# Patient Record
Sex: FEMALE | Race: WHITE | Employment: OTHER | URBAN - METROPOLITAN AREA
[De-identification: names, ages, dates, MRNs, and addresses within clinical notes are randomized per-mention and may not be internally consistent; named-entity substitution may affect disease eponyms.]

---

## 2022-01-31 ENCOUNTER — HOME HEALTH ADMISSION (OUTPATIENT)
Dept: HOME HEALTH SERVICES | Facility: HOME HEALTH | Age: 77
End: 2022-01-31
Payer: MEDICARE

## 2022-02-02 ENCOUNTER — HOME CARE VISIT (OUTPATIENT)
Dept: SCHEDULING | Facility: HOME HEALTH | Age: 77
End: 2022-02-02
Payer: MEDICARE

## 2022-02-02 VITALS
TEMPERATURE: 98.3 F | SYSTOLIC BLOOD PRESSURE: 130 MMHG | OXYGEN SATURATION: 98 % | RESPIRATION RATE: 16 BRPM | DIASTOLIC BLOOD PRESSURE: 70 MMHG | HEART RATE: 68 BPM

## 2022-02-02 PROCEDURE — 400013 HH SOC

## 2022-02-02 PROCEDURE — G0151 HHCP-SERV OF PT,EA 15 MIN: HCPCS

## 2022-02-03 ENCOUNTER — HOME CARE VISIT (OUTPATIENT)
Dept: SCHEDULING | Facility: HOME HEALTH | Age: 77
End: 2022-02-03
Payer: MEDICARE

## 2022-02-03 VITALS
OXYGEN SATURATION: 98 % | RESPIRATION RATE: 16 BRPM | TEMPERATURE: 98.9 F | HEART RATE: 76 BPM | DIASTOLIC BLOOD PRESSURE: 70 MMHG | SYSTOLIC BLOOD PRESSURE: 130 MMHG

## 2022-02-03 PROCEDURE — G0151 HHCP-SERV OF PT,EA 15 MIN: HCPCS

## 2022-02-08 ENCOUNTER — HOME CARE VISIT (OUTPATIENT)
Dept: SCHEDULING | Facility: HOME HEALTH | Age: 77
End: 2022-02-08
Payer: MEDICARE

## 2022-02-08 VITALS
TEMPERATURE: 96.9 F | HEART RATE: 65 BPM | OXYGEN SATURATION: 96 % | RESPIRATION RATE: 16 BRPM | SYSTOLIC BLOOD PRESSURE: 160 MMHG | DIASTOLIC BLOOD PRESSURE: 80 MMHG

## 2022-02-08 PROCEDURE — G0151 HHCP-SERV OF PT,EA 15 MIN: HCPCS

## 2022-02-10 ENCOUNTER — HOME CARE VISIT (OUTPATIENT)
Dept: SCHEDULING | Facility: HOME HEALTH | Age: 77
End: 2022-02-10
Payer: MEDICARE

## 2022-02-10 VITALS
HEART RATE: 66 BPM | TEMPERATURE: 96.9 F | DIASTOLIC BLOOD PRESSURE: 80 MMHG | OXYGEN SATURATION: 98 % | RESPIRATION RATE: 16 BRPM | SYSTOLIC BLOOD PRESSURE: 160 MMHG

## 2022-02-10 PROCEDURE — G0151 HHCP-SERV OF PT,EA 15 MIN: HCPCS

## 2022-02-15 ENCOUNTER — HOME CARE VISIT (OUTPATIENT)
Dept: SCHEDULING | Facility: HOME HEALTH | Age: 77
End: 2022-02-15
Payer: MEDICARE

## 2022-02-15 VITALS
RESPIRATION RATE: 16 BRPM | DIASTOLIC BLOOD PRESSURE: 65 MMHG | SYSTOLIC BLOOD PRESSURE: 140 MMHG | HEART RATE: 74 BPM | TEMPERATURE: 97.7 F | OXYGEN SATURATION: 97 %

## 2022-02-15 PROCEDURE — G0151 HHCP-SERV OF PT,EA 15 MIN: HCPCS

## 2022-02-17 ENCOUNTER — HOME CARE VISIT (OUTPATIENT)
Dept: SCHEDULING | Facility: HOME HEALTH | Age: 77
End: 2022-02-17
Payer: MEDICARE

## 2022-02-17 VITALS
SYSTOLIC BLOOD PRESSURE: 125 MMHG | TEMPERATURE: 97.9 F | OXYGEN SATURATION: 98 % | DIASTOLIC BLOOD PRESSURE: 80 MMHG | RESPIRATION RATE: 16 BRPM | HEART RATE: 71 BPM

## 2022-02-17 PROCEDURE — G0151 HHCP-SERV OF PT,EA 15 MIN: HCPCS

## 2022-07-27 ENCOUNTER — APPOINTMENT (OUTPATIENT)
Dept: GENERAL RADIOLOGY | Age: 77
End: 2022-07-27
Attending: EMERGENCY MEDICINE
Payer: MEDICARE

## 2022-07-27 ENCOUNTER — HOSPITAL ENCOUNTER (EMERGENCY)
Age: 77
Discharge: HOME | End: 2022-07-27
Attending: EMERGENCY MEDICINE
Payer: MEDICARE

## 2022-07-27 ENCOUNTER — APPOINTMENT (OUTPATIENT)
Dept: CT IMAGING | Age: 77
End: 2022-07-27
Attending: EMERGENCY MEDICINE
Payer: MEDICARE

## 2022-07-27 VITALS
WEIGHT: 205 LBS | DIASTOLIC BLOOD PRESSURE: 92 MMHG | RESPIRATION RATE: 18 BRPM | BODY MASS INDEX: 34.16 KG/M2 | TEMPERATURE: 98 F | HEIGHT: 65 IN | OXYGEN SATURATION: 98 % | HEART RATE: 88 BPM | SYSTOLIC BLOOD PRESSURE: 166 MMHG

## 2022-07-27 DIAGNOSIS — S50.311A ABRASION OF RIGHT ELBOW, INITIAL ENCOUNTER: ICD-10-CM
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
## 2022-07-27 PROCEDURE — 99284 EMERGENCY DEPT VISIT MOD MDM: CPT

## 2022-07-27 PROCEDURE — 70450 CT HEAD/BRAIN W/O DYE: CPT

## 2022-07-27 PROCEDURE — 73030 X-RAY EXAM OF SHOULDER: CPT

## 2022-07-27 PROCEDURE — 73502 X-RAY EXAM HIP UNI 2-3 VIEWS: CPT

## 2022-07-27 PROCEDURE — 72125 CT NECK SPINE W/O DYE: CPT

## 2022-07-27 PROCEDURE — 73562 X-RAY EXAM OF KNEE 3: CPT

## 2022-07-27 NOTE — ED PROVIDER NOTES
68-year-old female with history of rheumatoid arthritis with chronic bilateral shoulder pain presents by EMS after a ground-level fall. She was trying a new walker outside when she tripped and fell onto the right-hand side. She tells me she hit her right hand side on the concrete, except her head, which hit the grass. No loss of consciousness. She complains of pain to the right side of her neck, right shoulder, right knee. No blood thinners. The history is provided by the patient and medical records. Fall  The accident occurred Less than 1 hour ago. She fell from a height of ground level. She landed on [place]. There was no blood loss. The pain is present in the head, neck, right shoulder and right knee. The pain is moderate. Associated symptoms include headaches. Pertinent negatives include no fever, no abdominal pain, no nausea and no vomiting. Headache   Associated symptoms include dizziness. Pertinent negatives include no fever, no shortness of breath, no weakness, no nausea and no vomiting. Shoulder Pain     Knee Pain     Hip Injury        No past medical history on file. No past surgical history on file. No family history on file.     Social History     Socioeconomic History    Marital status:      Spouse name: Not on file    Number of children: Not on file    Years of education: Not on file    Highest education level: Not on file   Occupational History    Not on file   Tobacco Use    Smoking status: Not on file    Smokeless tobacco: Not on file   Substance and Sexual Activity    Alcohol use: Not on file    Drug use: Not on file    Sexual activity: Not on file   Other Topics Concern    Not on file   Social History Narrative    Not on file     Social Determinants of Health     Financial Resource Strain: Not on file   Food Insecurity: Not on file   Transportation Needs: Not on file   Physical Activity: Not on file   Stress: Not on file   Social Connections: Not on file   Intimate Partner Violence: Not on file   Housing Stability: Not on file         ALLERGIES: Patient has no allergy information on record. Review of Systems   Constitutional:  Negative for fatigue and fever. HENT:  Negative for sneezing and sore throat. Respiratory:  Negative for cough and shortness of breath. Cardiovascular:  Negative for chest pain and leg swelling. Gastrointestinal:  Negative for abdominal pain, diarrhea, nausea and vomiting. Genitourinary:  Negative for difficulty urinating and dysuria. Musculoskeletal:  Negative for arthralgias and myalgias. Skin:  Negative for color change and rash. Neurological:  Positive for dizziness and headaches. Negative for weakness. Psychiatric/Behavioral:  Negative for agitation and behavioral problems. There were no vitals filed for this visit. Physical Exam  Vitals and nursing note reviewed. Constitutional:       General: She is not in acute distress. Appearance: Normal appearance. She is well-developed. She is not ill-appearing, toxic-appearing or diaphoretic. HENT:      Head: Normocephalic and atraumatic. Nose: Nose normal.      Mouth/Throat:      Mouth: Mucous membranes are moist.      Pharynx: Oropharynx is clear. Eyes:      Extraocular Movements: Extraocular movements intact. Conjunctiva/sclera: Conjunctivae normal.      Pupils: Pupils are equal, round, and reactive to light. Cardiovascular:      Rate and Rhythm: Normal rate and regular rhythm. Pulses: Normal pulses. Heart sounds: Normal heart sounds. Pulmonary:      Effort: Pulmonary effort is normal. No respiratory distress. Breath sounds: Normal breath sounds. No wheezing. Chest:      Chest wall: No tenderness. Abdominal:      General: Abdomen is flat. There is no distension. Palpations: Abdomen is soft. Tenderness: There is no abdominal tenderness. There is no guarding or rebound.    Musculoskeletal:         General: Signs of injury (Abrasion right elbow) present. No swelling or deformity. Normal range of motion. Cervical back: Normal range of motion and neck supple. Tenderness (Right paraspinal cervical tenderness without deformity) present. No rigidity. No muscular tenderness. Right lower leg: No edema. Left lower leg: No edema. Skin:     General: Skin is warm and dry. Capillary Refill: Capillary refill takes less than 2 seconds. Neurological:      Mental Status: She is alert and oriented to person, place, and time. Mental status is at baseline. Psychiatric:         Mood and Affect: Mood normal.         Behavior: Behavior normal.        MDM  Number of Diagnoses or Management Options  Fall, initial encounter  Diagnosis management comments: 61-year-old female presents as above after a fall. Reassuring imaging in the emergency department without evidence of intracranial injury, bony abnormality, fracture or dislocation. Plan to discharge with instructions to follow-up with primary care, return if needed.        Amount and/or Complexity of Data Reviewed  Tests in the radiology section of CPT®: reviewed           Procedures

## 2022-07-27 NOTE — ED TRIAGE NOTES
Patient arrives via [facility] from private residence after [facility] when using new walker due to getting used to new walker. Patient reports \"entire right side of body hit concrete and my head hit the grass\". Pt arrives in c-collar, no obvious deformities or bleeding noted. Pt also reports dizziness since yesterday along with HA.

## 2022-07-27 NOTE — ED NOTES
Pt given discharge instructions, patient education, no prescriptions, and follow up information. Pt verbalizes understanding. All questions answered. Patient discharged to home in private vehicle, ambulatory. Pt A&Ox4, RA, pain controlled.

## 2022-07-27 NOTE — DISCHARGE INSTRUCTIONS
Thank you for allowing us to provide you with medical care today. We realize that you have many choices for your emergency care needs. We thank you for choosing [facility]. Please choose us in the future for any continued health care needs. We hope we addressed all of your medical concerns. We strive to provide excellent quality care in the Emergency Department. Anything less than excellent does not meet our expectations. The exam and treatment you received in the Emergency Department were for an emergent problem and are not intended as complete care. It is important that you follow up with a doctor, nurse practitioner, or physician's assistant for ongoing care. If your symptoms worsen or you do not improve as expected and you are unable to reach your usual health care provider, you should return to the Emergency Department. We are available 24 hours a day. Take this sheet with you when you go to your follow-up visit. If you have any problem arranging the follow-up visit, contact the Emergency Department immediately. Make an appointment your family doctor for follow up of this visit. Return to the ER if you are unable to be seen in a timely manner.

## 2024-05-28 ENCOUNTER — TELEPHONE (OUTPATIENT)
Age: 79
End: 2024-05-28

## 2024-06-18 NOTE — TELEPHONE ENCOUNTER
Records received from [facility] Blake Ludwig, DO- not a current patient (looks to be Dr. Rodrigues prior practice).  Call was placed 5/28 and VM left to return call if appt needed. No return call at this time and will ask  to reach out again to ensure no appt is needed.

## 2024-08-01 ENCOUNTER — APPOINTMENT (OUTPATIENT)
Facility: HOSPITAL | Age: 79
DRG: 516 | End: 2024-08-01
Payer: MEDICARE

## 2024-08-01 ENCOUNTER — HOSPITAL ENCOUNTER (INPATIENT)
Facility: HOSPITAL | Age: 79
LOS: 11 days | Discharge: HOME | DRG: 516 | End: 2024-08-12
Attending: STUDENT IN AN ORGANIZED HEALTH CARE EDUCATION/TRAINING PROGRAM | Admitting: HOSPITALIST
Payer: MEDICARE
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
## 2024-08-01 DIAGNOSIS — I50.22 CHRONIC SYSTOLIC CONGESTIVE HEART FAILURE (HCC): ICD-10-CM

## 2024-08-01 DIAGNOSIS — S22.060A COMPRESSION FRACTURE OF T7 VERTEBRA, INITIAL ENCOUNTER (HCC): Primary | ICD-10-CM

## 2024-08-01 DIAGNOSIS — N17.9 AKI (ACUTE KIDNEY INJURY) (HCC): ICD-10-CM

## 2024-08-01 LAB
ALBUMIN SERPL-MCNC: 4.1 G/DL (ref 3.5–5.2)
ALBUMIN/GLOB SERPL: 1.4 (ref 1.1–2.2)
ALP SERPL-CCNC: 78 U/L (ref 35–104)
ALT SERPL-CCNC: 17 U/L (ref 10–35)
ANION GAP SERPL CALC-SCNC: 12 MMOL/L (ref 5–15)
AST SERPL-CCNC: 27 U/L (ref 10–35)
BASOPHILS # BLD: 0 K/UL (ref 0–1)
BASOPHILS NFR BLD: 0 % (ref 0–1)
BILIRUB SERPL-MCNC: 0.8 MG/DL (ref 0.2–1)
BUN SERPL-MCNC: 28 MG/DL (ref 8–23)
BUN/CREAT SERPL: 16 (ref 12–20)
CALCIUM SERPL-MCNC: 9.9 MG/DL (ref 8.8–10.2)
CHLORIDE SERPL-SCNC: 99 MMOL/L (ref 98–107)
CO2 SERPL-SCNC: 27 MMOL/L (ref 22–29)
COMMENT:: NORMAL
CREAT SERPL-MCNC: 1.73 MG/DL (ref 0.5–0.9)
DIFFERENTIAL METHOD BLD: ABNORMAL
EOSINOPHIL # BLD: 0.1 K/UL (ref 0–0.4)
EOSINOPHIL NFR BLD: 2 %
ERYTHROCYTE [DISTWIDTH] IN BLOOD BY AUTOMATED COUNT: 13.5 % (ref 11.5–14.5)
GLOBULIN SER CALC-MCNC: 2.9 G/DL (ref 2–4)
GLUCOSE BLD STRIP.AUTO-MCNC: 134 MG/DL (ref 65–117)
GLUCOSE SERPL-MCNC: 121 MG/DL (ref 65–100)
HCT VFR BLD AUTO: 37.6 % (ref 35–47)
HGB BLD-MCNC: 12.3 G/DL (ref 11.5–16)
IMM GRANULOCYTES # BLD AUTO: 0 K/UL (ref 0–0.04)
IMM GRANULOCYTES NFR BLD AUTO: 1 % (ref 0–0.5)
LYMPHOCYTES # BLD: 0.9 K/UL (ref 0.8–3.5)
LYMPHOCYTES NFR BLD: 15 % (ref 12–49)
MCH RBC QN AUTO: 31.3 PG (ref 26–34)
MCHC RBC AUTO-ENTMCNC: 32.7 G/DL (ref 30–36.5)
MCV RBC AUTO: 95.7 FL (ref 80–99)
MONOCYTES # BLD: 0.4 K/UL (ref 0–1)
MONOCYTES NFR BLD: 6 % (ref 5–13)
NEUTS SEG # BLD: 4.6 K/UL (ref 1.8–8)
NEUTS SEG NFR BLD: 76 % (ref 32–75)
NRBC # BLD: 0 K/UL (ref 0–0.01)
NRBC BLD-RTO: 0 PER 100 WBC
PLATELET # BLD AUTO: 144 K/UL (ref 150–400)
PMV BLD AUTO: 10.2 FL (ref 8.9–12.9)
POTASSIUM SERPL-SCNC: 5.4 MMOL/L (ref 3.5–5.1)
PROT SERPL-MCNC: 7 G/DL (ref 6.4–8.3)
RBC # BLD AUTO: 3.93 M/UL (ref 3.8–5.2)
SERVICE CMNT-IMP: ABNORMAL
SODIUM SERPL-SCNC: 138 MMOL/L (ref 136–145)
SPECIMEN HOLD: NORMAL
WBC # BLD AUTO: 6.1 K/UL (ref 3.6–11)

## 2024-08-01 PROCEDURE — 93005 ELECTROCARDIOGRAM TRACING: CPT | Performed by: STUDENT IN AN ORGANIZED HEALTH CARE EDUCATION/TRAINING PROGRAM

## 2024-08-01 PROCEDURE — 71250 CT THORAX DX C-: CPT

## 2024-08-01 PROCEDURE — 72131 CT LUMBAR SPINE W/O DYE: CPT

## 2024-08-01 PROCEDURE — 36415 COLL VENOUS BLD VENIPUNCTURE: CPT

## 2024-08-01 PROCEDURE — 72125 CT NECK SPINE W/O DYE: CPT

## 2024-08-01 PROCEDURE — 2060000000 HC ICU INTERMEDIATE R&B

## 2024-08-01 PROCEDURE — 6360000002 HC RX W HCPCS: Performed by: STUDENT IN AN ORGANIZED HEALTH CARE EDUCATION/TRAINING PROGRAM

## 2024-08-01 PROCEDURE — 6370000000 HC RX 637 (ALT 250 FOR IP): Performed by: STUDENT IN AN ORGANIZED HEALTH CARE EDUCATION/TRAINING PROGRAM

## 2024-08-01 PROCEDURE — 80053 COMPREHEN METABOLIC PANEL: CPT

## 2024-08-01 PROCEDURE — 6370000000 HC RX 637 (ALT 250 FOR IP): Performed by: HOSPITALIST

## 2024-08-01 PROCEDURE — 72128 CT CHEST SPINE W/O DYE: CPT

## 2024-08-01 PROCEDURE — 99285 EMERGENCY DEPT VISIT HI MDM: CPT

## 2024-08-01 PROCEDURE — 83036 HEMOGLOBIN GLYCOSYLATED A1C: CPT

## 2024-08-01 PROCEDURE — 85025 COMPLETE CBC W/AUTO DIFF WBC: CPT

## 2024-08-01 PROCEDURE — 2580000003 HC RX 258: Performed by: HOSPITALIST

## 2024-08-01 PROCEDURE — 82962 GLUCOSE BLOOD TEST: CPT

## 2024-08-01 PROCEDURE — 70450 CT HEAD/BRAIN W/O DYE: CPT

## 2024-08-01 PROCEDURE — 2580000003 HC RX 258: Performed by: STUDENT IN AN ORGANIZED HEALTH CARE EDUCATION/TRAINING PROGRAM

## 2024-08-01 RX ORDER — PANTOPRAZOLE SODIUM 40 MG/1
40 TABLET, DELAYED RELEASE ORAL
Status: DISCONTINUED | OUTPATIENT
Start: 2024-08-02 | End: 2024-08-12 | Stop reason: HOSPADM

## 2024-08-01 RX ORDER — ACETAMINOPHEN 500 MG
1000 TABLET ORAL
Status: COMPLETED | OUTPATIENT
Start: 2024-08-01 | End: 2024-08-01

## 2024-08-01 RX ORDER — INSULIN LISPRO 100 [IU]/ML
0-4 INJECTION, SOLUTION INTRAVENOUS; SUBCUTANEOUS
Status: DISCONTINUED | OUTPATIENT
Start: 2024-08-02 | End: 2024-08-12 | Stop reason: HOSPADM

## 2024-08-01 RX ORDER — INSULIN LISPRO 100 [IU]/ML
0-4 INJECTION, SOLUTION INTRAVENOUS; SUBCUTANEOUS NIGHTLY
Status: DISCONTINUED | OUTPATIENT
Start: 2024-08-01 | End: 2024-08-12 | Stop reason: HOSPADM

## 2024-08-01 RX ORDER — DEXTROSE MONOHYDRATE 100 MG/ML
INJECTION, SOLUTION INTRAVENOUS CONTINUOUS PRN
Status: DISCONTINUED | OUTPATIENT
Start: 2024-08-01 | End: 2024-08-12 | Stop reason: HOSPADM

## 2024-08-01 RX ORDER — DULOXETIN HYDROCHLORIDE 30 MG/1
60 CAPSULE, DELAYED RELEASE ORAL DAILY
Status: DISCONTINUED | OUTPATIENT
Start: 2024-08-02 | End: 2024-08-12 | Stop reason: HOSPADM

## 2024-08-01 RX ORDER — ASCORBIC ACID 500 MG
500 TABLET ORAL DAILY
Status: DISCONTINUED | OUTPATIENT
Start: 2024-08-02 | End: 2024-08-12 | Stop reason: HOSPADM

## 2024-08-01 RX ORDER — OXYCODONE AND ACETAMINOPHEN 7.5; 325 MG/1; MG/1
1 TABLET ORAL EVERY 6 HOURS PRN
Status: DISCONTINUED | OUTPATIENT
Start: 2024-08-01 | End: 2024-08-12 | Stop reason: HOSPADM

## 2024-08-01 RX ORDER — SODIUM CHLORIDE 9 MG/ML
INJECTION, SOLUTION INTRAVENOUS PRN
Status: DISCONTINUED | OUTPATIENT
Start: 2024-08-01 | End: 2024-08-12 | Stop reason: HOSPADM

## 2024-08-01 RX ORDER — ACETAMINOPHEN 325 MG/1
650 TABLET ORAL EVERY 6 HOURS PRN
Status: DISCONTINUED | OUTPATIENT
Start: 2024-08-01 | End: 2024-08-12 | Stop reason: HOSPADM

## 2024-08-01 RX ORDER — SENNA AND DOCUSATE SODIUM 50; 8.6 MG/1; MG/1
1 TABLET, FILM COATED ORAL DAILY PRN
Status: DISCONTINUED | OUTPATIENT
Start: 2024-08-01 | End: 2024-08-12 | Stop reason: HOSPADM

## 2024-08-01 RX ORDER — ALBUTEROL SULFATE 2.5 MG/3ML
2.5 SOLUTION RESPIRATORY (INHALATION) EVERY 4 HOURS PRN
Status: DISCONTINUED | OUTPATIENT
Start: 2024-08-01 | End: 2024-08-12 | Stop reason: HOSPADM

## 2024-08-01 RX ORDER — INSULIN LISPRO 100 [IU]/ML
0-4 INJECTION, SOLUTION INTRAVENOUS; SUBCUTANEOUS NIGHTLY
Status: DISCONTINUED | OUTPATIENT
Start: 2024-08-01 | End: 2024-08-01

## 2024-08-01 RX ORDER — LIDOCAINE 4 G/G
3 PATCH TOPICAL
Status: COMPLETED | OUTPATIENT
Start: 2024-08-01 | End: 2024-08-02

## 2024-08-01 RX ORDER — SODIUM CHLORIDE 0.9 % (FLUSH) 0.9 %
5-40 SYRINGE (ML) INJECTION PRN
Status: DISCONTINUED | OUTPATIENT
Start: 2024-08-01 | End: 2024-08-12 | Stop reason: HOSPADM

## 2024-08-01 RX ORDER — MAGNESIUM OXIDE 400 MG/1
400 TABLET ORAL DAILY
Status: DISCONTINUED | OUTPATIENT
Start: 2024-08-01 | End: 2024-08-01

## 2024-08-01 RX ORDER — HYDROXYCHLOROQUINE SULFATE 200 MG/1
200 TABLET, FILM COATED ORAL DAILY
Status: DISCONTINUED | OUTPATIENT
Start: 2024-08-01 | End: 2024-08-01

## 2024-08-01 RX ORDER — ONDANSETRON 4 MG/1
4 TABLET, ORALLY DISINTEGRATING ORAL EVERY 8 HOURS PRN
Status: DISCONTINUED | OUTPATIENT
Start: 2024-08-01 | End: 2024-08-12 | Stop reason: HOSPADM

## 2024-08-01 RX ORDER — ACETAMINOPHEN 650 MG/1
650 SUPPOSITORY RECTAL EVERY 6 HOURS PRN
Status: DISCONTINUED | OUTPATIENT
Start: 2024-08-01 | End: 2024-08-12 | Stop reason: HOSPADM

## 2024-08-01 RX ORDER — FUROSEMIDE 20 MG/1
20 TABLET ORAL DAILY
Status: DISCONTINUED | OUTPATIENT
Start: 2024-08-01 | End: 2024-08-12 | Stop reason: HOSPADM

## 2024-08-01 RX ORDER — LANOLIN ALCOHOL/MO/W.PET/CERES
400 CREAM (GRAM) TOPICAL DAILY
Status: DISCONTINUED | OUTPATIENT
Start: 2024-08-02 | End: 2024-08-12 | Stop reason: HOSPADM

## 2024-08-01 RX ORDER — METHOCARBAMOL 500 MG/1
750 TABLET, FILM COATED ORAL ONCE
Status: COMPLETED | OUTPATIENT
Start: 2024-08-01 | End: 2024-08-01

## 2024-08-01 RX ORDER — INSULIN LISPRO 100 [IU]/ML
0-4 INJECTION, SOLUTION INTRAVENOUS; SUBCUTANEOUS
Status: DISCONTINUED | OUTPATIENT
Start: 2024-08-01 | End: 2024-08-01

## 2024-08-01 RX ORDER — MORPHINE SULFATE 4 MG/ML
2 INJECTION, SOLUTION INTRAMUSCULAR; INTRAVENOUS EVERY 4 HOURS PRN
Status: DISCONTINUED | OUTPATIENT
Start: 2024-08-01 | End: 2024-08-12 | Stop reason: HOSPADM

## 2024-08-01 RX ORDER — POLYETHYLENE GLYCOL 3350 17 G/17G
17 POWDER, FOR SOLUTION ORAL DAILY PRN
Status: DISCONTINUED | OUTPATIENT
Start: 2024-08-01 | End: 2024-08-12 | Stop reason: HOSPADM

## 2024-08-01 RX ORDER — SODIUM CHLORIDE 0.9 % (FLUSH) 0.9 %
5-40 SYRINGE (ML) INJECTION EVERY 12 HOURS SCHEDULED
Status: DISCONTINUED | OUTPATIENT
Start: 2024-08-01 | End: 2024-08-12 | Stop reason: HOSPADM

## 2024-08-01 RX ORDER — 0.9 % SODIUM CHLORIDE 0.9 %
500 INTRAVENOUS SOLUTION INTRAVENOUS ONCE
Status: COMPLETED | OUTPATIENT
Start: 2024-08-01 | End: 2024-08-01

## 2024-08-01 RX ORDER — ATORVASTATIN CALCIUM 20 MG/1
40 TABLET, FILM COATED ORAL
Status: DISCONTINUED | OUTPATIENT
Start: 2024-08-01 | End: 2024-08-12 | Stop reason: HOSPADM

## 2024-08-01 RX ORDER — ONDANSETRON 2 MG/ML
4 INJECTION INTRAMUSCULAR; INTRAVENOUS EVERY 6 HOURS PRN
Status: DISCONTINUED | OUTPATIENT
Start: 2024-08-01 | End: 2024-08-12 | Stop reason: HOSPADM

## 2024-08-01 RX ORDER — OXYCODONE HYDROCHLORIDE 5 MG/1
7.5 TABLET ORAL
Status: COMPLETED | OUTPATIENT
Start: 2024-08-01 | End: 2024-08-01

## 2024-08-01 RX ORDER — HYDROXYCHLOROQUINE SULFATE 200 MG/1
200 TABLET, FILM COATED ORAL 2 TIMES DAILY
Status: DISCONTINUED | OUTPATIENT
Start: 2024-08-02 | End: 2024-08-02

## 2024-08-01 RX ORDER — METOPROLOL SUCCINATE 50 MG/1
50 TABLET, EXTENDED RELEASE ORAL DAILY
Status: DISCONTINUED | OUTPATIENT
Start: 2024-08-02 | End: 2024-08-12 | Stop reason: HOSPADM

## 2024-08-01 RX ORDER — SODIUM CHLORIDE 9 MG/ML
INJECTION, SOLUTION INTRAVENOUS CONTINUOUS
Status: DISPENSED | OUTPATIENT
Start: 2024-08-01 | End: 2024-08-03

## 2024-08-01 RX ADMIN — SODIUM CHLORIDE 500 ML: 9 INJECTION, SOLUTION INTRAVENOUS at 17:58

## 2024-08-01 RX ADMIN — FUROSEMIDE 20 MG: 20 TABLET ORAL at 22:03

## 2024-08-01 RX ADMIN — SODIUM CHLORIDE, PRESERVATIVE FREE 10 ML: 5 INJECTION INTRAVENOUS at 21:51

## 2024-08-01 RX ADMIN — SODIUM CHLORIDE: 9 INJECTION, SOLUTION INTRAVENOUS at 22:03

## 2024-08-01 RX ADMIN — HYDROMORPHONE HYDROCHLORIDE 0.25 MG: 0.5 INJECTION, SOLUTION INTRAMUSCULAR; INTRAVENOUS; SUBCUTANEOUS at 17:57

## 2024-08-01 RX ADMIN — ACETAMINOPHEN 1000 MG: 500 TABLET ORAL at 15:51

## 2024-08-01 RX ADMIN — METHOCARBAMOL TABLETS 750 MG: 500 TABLET, COATED ORAL at 15:50

## 2024-08-01 RX ADMIN — ATORVASTATIN CALCIUM 40 MG: 20 TABLET, FILM COATED ORAL at 21:51

## 2024-08-01 RX ADMIN — OXYCODONE AND ACETAMINOPHEN 1 TABLET: 7.5; 325 TABLET ORAL at 22:54

## 2024-08-01 RX ADMIN — OXYCODONE 7.5 MG: 5 TABLET ORAL at 17:49

## 2024-08-01 ASSESSMENT — PAIN DESCRIPTION - DESCRIPTORS
DESCRIPTORS: ACHING
DESCRIPTORS: ACHING;NAGGING
DESCRIPTORS: ACHING

## 2024-08-01 ASSESSMENT — PAIN DESCRIPTION - LOCATION
LOCATION: BACK
LOCATION: BACK
LOCATION: ABDOMEN

## 2024-08-01 ASSESSMENT — PAIN SCALES - GENERAL
PAINLEVEL_OUTOF10: 3
PAINLEVEL_OUTOF10: 5
PAINLEVEL_OUTOF10: 5
PAINLEVEL_OUTOF10: 3

## 2024-08-01 ASSESSMENT — LIFESTYLE VARIABLES
HOW MANY STANDARD DRINKS CONTAINING ALCOHOL DO YOU HAVE ON A TYPICAL DAY: 1 OR 2
HOW OFTEN DO YOU HAVE A DRINK CONTAINING ALCOHOL: MONTHLY OR LESS

## 2024-08-01 ASSESSMENT — PAIN SCALES - WONG BAKER: WONGBAKER_NUMERICALRESPONSE: HURTS A LITTLE BIT

## 2024-08-01 NOTE — PROGRESS NOTES
18:20 Received report from [facility] ER nurse Haydee who reviewed patients history, reason for admission, and course of events and will inquire with provider  elevated potassium treatment. Expected transfer pickup time in ten minutes.  JAZ Hernandez

## 2024-08-01 NOTE — ED PROVIDER NOTES
[facility] EMERGENCY DEPT  EMERGENCY DEPARTMENT ENCOUNTER      Pt Name: Sabina Heath  MRN: [id number]  Birthdate 1945  Date of evaluation: 8/1/2024  Provider: Haydee Koo DO    CHIEF COMPLAINT       Chief Complaint   Patient presents with    Fall         HISTORY OF PRESENT ILLNESS    HPI    Sabina Heath is a 79 y.o. female with a past medical history significant for hypertension, hyperlipidemia, atrial fibrillation on Xarelto, diabetes, CHF, cardiomyopathy who presents to the emergency department for evaluation of pain after a fall.  Patient reports 1 week ago she sustained a ground-level fall while changing her sheets on her bed.  States she fell backward and landed right on her bottom.  She has a large bruise to her right buttock, notes some minor pain there but mostly pain throughout the lower back and thoracic spine.  Also endorsing bilateral rib pain with increased pain with movement and breathing.  Denies head injury or LOC.  No fevers, cough, vomiting or diarrhea.  States she has chronic arthralgias due to rheumatoid arthritis and is chronically on 7.5 mg oxycodone, states she takes this in the morning typically which she has been doing and gets mild relief of her pain after the fall    Nursing Notes were reviewed.    REVIEW OF SYSTEMS       Review of Systems   Constitutional:  Negative for fever.   Respiratory:  Negative for shortness of breath.    Cardiovascular:  Positive for chest pain.   Musculoskeletal:  Positive for arthralgias and back pain. Negative for neck pain.   Neurological:  Negative for syncope and speech difficulty.           PAST MEDICAL HISTORY     Past Medical History:   Diagnosis Date    Atrial fibrillation (HCC)     Cardiomyopathy (HCC)     CHF (congestive heart failure) (HCC)     Diabetes (HCC)     DVT (deep venous thrombosis) (HCC)     GERD with esophagitis     Hypercholesteremia     Rheumatoid arthritis (HCC)          SURGICAL HISTORY     History reviewed. No  Course.  ECG/medicine tests: ordered.    Risk  OTC drugs.  Prescription drug management.  Decision regarding hospitalization.        CONSULTS:  None    REASSESSMENT     On reevaluation, patient has continued pain despite her home oxycodone, Tylenol and Robaxin here in the emergency department.  She is hesitant to take IV narcotic pain medication and would like to trial a dose of her home oxycodone.  Discussed at length with patient regarding pain control at home versus admission.  Given her age, lives alone and is already been taking home oxycodone 7.5mg 3 times daily patient will benefit from mission for pain control as well as possible kyphoplasty.    [facility] Consult for Admission  4:34 PM    ED Room Number: C10/C10  Patient Name and age:  Sabina Heath 79 y.o.  female  Working Diagnosis:   1. Fall, initial encounter    2. Compression fracture of T7 vertebra, initial encounter ([facility])    3. JULY (acute kidney injury) ([facility])        COVID-19 Suspicion: No  Sepsis present:  No  Reassessment needed: No  Code Status:  Full Code  Readmission: No  Isolation Requirements: no  Recommended Level of Care: med/surg  Department: [place] [facility] - [phone]  Consulting Provider:     Other:  79 y.o. female with a past medical history significant for hypertension, hyperlipidemia, atrial fibrillation on Xarelto, diabetes, CHF, cardiomyopathy who presents to the emergency department for evaluation of pain after a fall.  Worsening back pain since the fall. CT imaging shows a t7 superior endplate compression fracture. Despite her home oxycodone that she has for RA, still having uncontrolled pain.  BUN is 28 and creatinine 1.7, no comparisons in our system unsure if this is an JULY or CKD. K is 5.4      PROCEDURES:  Unless otherwise noted below, none     Procedures      FINAL IMPRESSION      1. Fall, initial encounter    2. Compression fracture of T7 vertebra, initial encounter ([facility])    3. JULY (acute kidney injury) ([facility])

## 2024-08-01 NOTE — H&P
Hospitalist Admission Note      NAME:  Sabina Heath   :  1945   MRN:  [id number]     Date/Time:  2024 7:25 PM    Patient PCP: No primary care provider on file.    ________________________________________________________________________    Given the patient's current clinical presentation, I have a high level of concern for decompensation if discharged from the emergency department.  Complex decision making was performed, which includes reviewing the patient's available past medical records, laboratory results, and x-ray films.       My assessment of this patient's clinical condition and my plan of care is as follows.    Assessment / Plan:  Patient is a 79-year-old female with a history of A-fib, diabetes, rheumatoid arthritis, hyperlipidemia, renal failure comes to the hospital after having a fall at home.  Patient was found to have a T7 compression fracture.  Patient is admitted for pain control, PT OT evaluation and IR consult.    1.  T7 compression fracture  Pain control, PT OT  Interventional radiology consult for vertebroplasty.    2.  Atrial fibrillation  Patient has a pacemaker and defibrillator.  Last EF 40 to 45% with mild MVR.  Patient is taking.  Holding dialysis Xarelto and Toprol-XL.    3.  Diabetes  Patient is taking metformin at home.  I will hold it for now.  Check hemoglobin A1c.  Start sliding scale insulin.    4.  Rheumatoid arthritis  Patient is on Plaquenil and methotrexate and folic acid.    5.  Hyperlipidemia  Continue Lipitor 40 mg nightly    6.  Thrombocytopenia  Monitor platelets closely    7.  Hyperkalemia  Holding Entresto and spironolactone.    8.  Renal failure  Not sure acute versus chronic.  If chronic patient is in CKD stage IV.      I have personally reviewed the radiographs, laboratory data in Epic and decisions and statements above are based partially on this personal interpretation.    Code Status: Full Code  DVT Prophylaxis:  On Xarelto  GI Prophylaxis: not  well-nourished, in no acute distress  HEENT:  Pink conjunctivae, PERRL, hearing intact to voice, moist mucous membranes  Neck: Supple, without masses, thyroid non-tender  Resp: No accessory muscle use, clear breath sounds without wheezes rales or rhonchi  Card: No murmurs, normal S1, S2 without thrills, bruits or peripheral edema  Abd:  Soft, non-tender, non-distended, normoactive bowel sounds are present, no palpable organomegaly and no detectable hernias  Lymph:  No cervical or inguinal adenopathy  Musc: No cyanosis or clubbing  Skin: No rashes or ulcers, skin turgor is good  Neuro:  Cranial nerves are grossly intact, no focal motor weakness, follows commands appropriately  Psych:  Good insight, oriented to person, place and time, alert          _______________________________________________________________________  Care Plan discussed with:    Comments   Patient x Discussed with patient in room. POC outlined and Questions answered    Family      RN x    Care Manager                    Consultant:  katie BARAHONA MD   _______________________________________________________________________  Recommended Disposition:   Home with Family x   HH/PT/OT/RN    SNF/LTC    [facility]    ________________________________________________________________________  TOTAL TIME: 60 minutes        Comments   >50% of visit spent in counseling and coordination of care  Chart reviewed  Discussion with patient and/or family and questions answered     ________________________________________________________________________  Signed: Mirza Perez MD        Procedures: see electronic medical records for all procedures/Xrays/labs and details which were not copied into this note but were reviewed prior to creation of Plan.

## 2024-08-01 NOTE — PROGRESS NOTES
Pharmacy Dosing Services: 08/01/24  Xarelto dose change per renal protocol  Physician Dr Perez  Indication: Afib    Serum Creatinine Lab Results   Component Value Date/Time    CREATININE 1.73 08/01/2024 02:45 PM      Creatinine Clearance Estimated Creatinine Clearance: 29 mL/min (A) (based on SCr of 1.73 mg/dL (H)).   BUN Lab Results   Component Value Date/Time    BUN 28 08/01/2024 02:45 PM         Current regimen: Xarelto 20 mg po daily  New regimen: Xarelto 15 mg po daily    Thank you  Pharmacist  Mary Carmen Gil, PharmD   [phone]

## 2024-08-01 NOTE — ED TRIAGE NOTES
Arrives via EMS with complaints of buttocks pain after a GLF 1 week ago.     Reports she was changing the sheets on her bed, \"got tripped up\" causing her to fall on the ground. Large amount of bruising noted to buttocks. Also with complaints of bilateral posterior rib pain and mid chest pain with inspiration.     Takes a blood thinner, denies LOC.

## 2024-08-01 NOTE — ED NOTES
TRANSFER - OUT REPORT:    Verbal report given to Marisela Nuñez on Sabina Heath  being transferred to [facility] for routine progression of patient care       Report consisted of patient's Situation, Background, Assessment and   Recommendations(SBAR).     Information from the following report(s) Nurse Handoff Report, ED Encounter Summary, ED SBAR, MAR, and Recent Results was reviewed with the receiving nurse.    [place] Fall Assessment:    Presents to emergency department  because of falls (Syncope, seizure, or loss of consciousness): Yes  Age > 70: Yes  Altered Mental Status, Intoxication with alcohol or substance confusion (Disorientation, impaired judgment, poor safety awaremess, or inability to follow instructions): No  Impaired Mobility: Ambulates or transfers with assistive devices or assistance; Unable to ambulate or transer.: Yes  Nursing Judgement: Yes          Lines:   Peripheral IV 08/01/24 Left Antecubital (Active)   Site Assessment Clean, dry & intact 08/01/24 1444   Line Status Blood return noted 08/01/24 1444   Line Care Cap changed 08/01/24 1444        Opportunity for questions and clarification was provided.      Patient transported with:  Monitor

## 2024-08-02 ENCOUNTER — APPOINTMENT (OUTPATIENT)
Facility: HOSPITAL | Age: 79
DRG: 516 | End: 2024-08-02
Attending: STUDENT IN AN ORGANIZED HEALTH CARE EDUCATION/TRAINING PROGRAM
Payer: MEDICARE

## 2024-08-02 LAB
ANION GAP SERPL CALC-SCNC: 7 MMOL/L (ref 5–15)
BASOPHILS # BLD: 0 K/UL (ref 0–0.1)
BASOPHILS NFR BLD: 0 % (ref 0–1)
BUN SERPL-MCNC: 29 MG/DL (ref 6–20)
BUN/CREAT SERPL: 17 (ref 12–20)
CALCIUM SERPL-MCNC: 9.2 MG/DL (ref 8.5–10.1)
CHLORIDE SERPL-SCNC: 104 MMOL/L (ref 97–108)
CO2 SERPL-SCNC: 27 MMOL/L (ref 21–32)
CREAT SERPL-MCNC: 1.69 MG/DL (ref 0.55–1.02)
DIFFERENTIAL METHOD BLD: ABNORMAL
ECHO AO ARCH DIAM: 2.3 CM
ECHO AO ROOT DIAM: 3.4 CM
ECHO AO ROOT INDEX: 1.68 CM/M2
ECHO AV AREA PEAK VELOCITY: 1.7 CM2
ECHO AV AREA VTI: 1.6 CM2
ECHO AV AREA/BSA PEAK VELOCITY: 0.8 CM2/M2
ECHO AV AREA/BSA VTI: 0.8 CM2/M2
ECHO AV MEAN GRADIENT: 7 MMHG
ECHO AV MEAN VELOCITY: 1.3 M/S
ECHO AV PEAK GRADIENT: 13 MMHG
ECHO AV PEAK VELOCITY: 1.8 M/S
ECHO AV VELOCITY RATIO: 0.67
ECHO AV VTI: 34.5 CM
ECHO BSA: 2.11 M2
ECHO LA DIAMETER INDEX: 1.39 CM/M2
ECHO LA DIAMETER: 2.8 CM
ECHO LA TO AORTIC ROOT RATIO: 0.82
ECHO LV E' LATERAL VELOCITY: 9 CM/S
ECHO LV E' SEPTAL VELOCITY: 7 CM/S
ECHO LV FRACTIONAL SHORTENING: 42 % (ref 28–44)
ECHO LV INTERNAL DIMENSION DIASTOLE INDEX: 2.57 CM/M2
ECHO LV INTERNAL DIMENSION DIASTOLIC: 5.2 CM (ref 3.9–5.3)
ECHO LV INTERNAL DIMENSION SYSTOLIC INDEX: 1.49 CM/M2
ECHO LV INTERNAL DIMENSION SYSTOLIC: 3 CM
ECHO LV IVSD: 0.4 CM (ref 0.6–0.9)
ECHO LV MASS 2D: 63.3 G (ref 67–162)
ECHO LV MASS INDEX 2D: 31.3 G/M2 (ref 43–95)
ECHO LV POSTERIOR WALL DIASTOLIC: 0.4 CM (ref 0.6–0.9)
ECHO LV RELATIVE WALL THICKNESS RATIO: 0.15
ECHO LVOT AREA: 2.5 CM2
ECHO LVOT AV VTI INDEX: 0.6
ECHO LVOT DIAM: 1.8 CM
ECHO LVOT MEAN GRADIENT: 3 MMHG
ECHO LVOT PEAK GRADIENT: 6 MMHG
ECHO LVOT PEAK VELOCITY: 1.2 M/S
ECHO LVOT STROKE VOLUME INDEX: 26.2 ML/M2
ECHO LVOT SV: 52.9 ML
ECHO LVOT VTI: 20.8 CM
ECHO MV A VELOCITY: 0.39 M/S
ECHO MV E DECELERATION TIME (DT): 130 MS
ECHO MV E VELOCITY: 0.7 M/S
ECHO MV E/A RATIO: 1.79
ECHO MV E/E' LATERAL: 7.78
ECHO MV E/E' RATIO (AVERAGED): 8.89
ECHO MV E/E' SEPTAL: 10
EKG ATRIAL RATE: 81 BPM
EKG DIAGNOSIS: NORMAL
EKG P AXIS: 53 DEGREES
EKG P-R INTERVAL: 186 MS
EKG Q-T INTERVAL: 422 MS
EKG QRS DURATION: 142 MS
EKG QTC CALCULATION (BAZETT): 490 MS
EKG R AXIS: 158 DEGREES
EKG T AXIS: -21 DEGREES
EKG VENTRICULAR RATE: 81 BPM
EOSINOPHIL # BLD: 0.2 K/UL (ref 0–0.4)
EOSINOPHIL NFR BLD: 4 % (ref 0–7)
ERYTHROCYTE [DISTWIDTH] IN BLOOD BY AUTOMATED COUNT: 13.5 % (ref 11.5–14.5)
EST. AVERAGE GLUCOSE BLD GHB EST-MCNC: 134 MG/DL
GLUCOSE BLD STRIP.AUTO-MCNC: 113 MG/DL (ref 65–117)
GLUCOSE BLD STRIP.AUTO-MCNC: 124 MG/DL (ref 65–117)
GLUCOSE BLD STRIP.AUTO-MCNC: 133 MG/DL (ref 65–117)
GLUCOSE BLD STRIP.AUTO-MCNC: 157 MG/DL (ref 65–117)
GLUCOSE SERPL-MCNC: 122 MG/DL (ref 65–100)
HBA1C MFR BLD: 6.3 % (ref 4–5.6)
HCT VFR BLD AUTO: 39.4 % (ref 35–47)
HGB BLD-MCNC: 13.1 G/DL (ref 11.5–16)
IMM GRANULOCYTES # BLD AUTO: 0 K/UL (ref 0–0.04)
IMM GRANULOCYTES NFR BLD AUTO: 0 % (ref 0–0.5)
LYMPHOCYTES # BLD: 1.3 K/UL (ref 0.8–3.5)
LYMPHOCYTES NFR BLD: 28 % (ref 12–49)
MCH RBC QN AUTO: 31.7 PG (ref 26–34)
MCHC RBC AUTO-ENTMCNC: 33.2 G/DL (ref 30–36.5)
MCV RBC AUTO: 95.4 FL (ref 80–99)
MONOCYTES # BLD: 0.3 K/UL (ref 0–1)
MONOCYTES NFR BLD: 6 % (ref 5–13)
NEUTS SEG # BLD: 3 K/UL (ref 1.8–8)
NEUTS SEG NFR BLD: 62 % (ref 32–75)
NRBC # BLD: 0 K/UL (ref 0–0.01)
NRBC BLD-RTO: 0 PER 100 WBC
PLATELET # BLD AUTO: 143 K/UL (ref 150–400)
PMV BLD AUTO: 9.8 FL (ref 8.9–12.9)
POTASSIUM SERPL-SCNC: 4.1 MMOL/L (ref 3.5–5.1)
RBC # BLD AUTO: 4.13 M/UL (ref 3.8–5.2)
RBC MORPH BLD: ABNORMAL
SERVICE CMNT-IMP: ABNORMAL
SERVICE CMNT-IMP: NORMAL
SODIUM SERPL-SCNC: 138 MMOL/L (ref 136–145)
WBC # BLD AUTO: 4.8 K/UL (ref 3.6–11)

## 2024-08-02 PROCEDURE — 6370000000 HC RX 637 (ALT 250 FOR IP): Performed by: HOSPITALIST

## 2024-08-02 PROCEDURE — 93306 TTE W/DOPPLER COMPLETE: CPT

## 2024-08-02 PROCEDURE — 6370000000 HC RX 637 (ALT 250 FOR IP): Performed by: STUDENT IN AN ORGANIZED HEALTH CARE EDUCATION/TRAINING PROGRAM

## 2024-08-02 PROCEDURE — 93321 DOPPLER ECHO F-UP/LMTD STD: CPT | Performed by: INTERNAL MEDICINE

## 2024-08-02 PROCEDURE — 85025 COMPLETE CBC W/AUTO DIFF WBC: CPT

## 2024-08-02 PROCEDURE — 94761 N-INVAS EAR/PLS OXIMETRY MLT: CPT

## 2024-08-02 PROCEDURE — 93010 ELECTROCARDIOGRAM REPORT: CPT | Performed by: INTERNAL MEDICINE

## 2024-08-02 PROCEDURE — 80048 BASIC METABOLIC PNL TOTAL CA: CPT

## 2024-08-02 PROCEDURE — 1100000000 HC RM PRIVATE

## 2024-08-02 PROCEDURE — 2580000003 HC RX 258: Performed by: HOSPITALIST

## 2024-08-02 PROCEDURE — 82962 GLUCOSE BLOOD TEST: CPT

## 2024-08-02 PROCEDURE — 93308 TTE F-UP OR LMTD: CPT | Performed by: INTERNAL MEDICINE

## 2024-08-02 RX ORDER — TRIAMCINOLONE ACETONIDE 1 MG/G
CREAM TOPICAL 2 TIMES DAILY
Status: DISCONTINUED | OUTPATIENT
Start: 2024-08-02 | End: 2024-08-12 | Stop reason: HOSPADM

## 2024-08-02 RX ORDER — HYDROXYCHLOROQUINE SULFATE 200 MG/1
200 TABLET, FILM COATED ORAL 2 TIMES DAILY
Status: DISCONTINUED | OUTPATIENT
Start: 2024-08-02 | End: 2024-08-12 | Stop reason: HOSPADM

## 2024-08-02 RX ORDER — HYDROXYCHLOROQUINE SULFATE 200 MG/1
1400 TABLET, FILM COATED ORAL WEEKLY
Status: DISCONTINUED | OUTPATIENT
Start: 2024-08-06 | End: 2024-08-02

## 2024-08-02 RX ORDER — METOPROLOL SUCCINATE 50 MG/1
50 TABLET, EXTENDED RELEASE ORAL DAILY
Status: ON HOLD | COMMUNITY
End: 2024-08-08 | Stop reason: HOSPADM

## 2024-08-02 RX ORDER — SPIRONOLACTONE 25 MG/1
25 TABLET ORAL DAILY
Status: DISCONTINUED | OUTPATIENT
Start: 2024-08-02 | End: 2024-08-12 | Stop reason: HOSPADM

## 2024-08-02 RX ADMIN — OXYCODONE AND ACETAMINOPHEN 1 TABLET: 7.5; 325 TABLET ORAL at 06:29

## 2024-08-02 RX ADMIN — Medication 400 MG: at 09:10

## 2024-08-02 RX ADMIN — SACUBITRIL AND VALSARTAN 1 TABLET: 24; 26 TABLET, FILM COATED ORAL at 21:28

## 2024-08-02 RX ADMIN — SODIUM CHLORIDE, PRESERVATIVE FREE 10 ML: 5 INJECTION INTRAVENOUS at 09:07

## 2024-08-02 RX ADMIN — METOPROLOL SUCCINATE 50 MG: 50 TABLET, EXTENDED RELEASE ORAL at 09:09

## 2024-08-02 RX ADMIN — SACUBITRIL AND VALSARTAN 1 TABLET: 24; 26 TABLET, FILM COATED ORAL at 12:46

## 2024-08-02 RX ADMIN — HYDROXYCHLOROQUINE SULFATE 200 MG: 200 TABLET ORAL at 21:28

## 2024-08-02 RX ADMIN — OXYCODONE AND ACETAMINOPHEN 1 TABLET: 7.5; 325 TABLET ORAL at 21:34

## 2024-08-02 RX ADMIN — TRIAMCINOLONE ACETONIDE: 1 CREAM TOPICAL at 21:30

## 2024-08-02 RX ADMIN — SPIRONOLACTONE 25 MG: 25 TABLET ORAL at 12:46

## 2024-08-02 RX ADMIN — HYDROXYCHLOROQUINE SULFATE 200 MG: 200 TABLET ORAL at 12:46

## 2024-08-02 RX ADMIN — PANTOPRAZOLE SODIUM 40 MG: 40 TABLET, DELAYED RELEASE ORAL at 06:26

## 2024-08-02 RX ADMIN — OXYCODONE AND ACETAMINOPHEN 1 TABLET: 7.5; 325 TABLET ORAL at 12:50

## 2024-08-02 RX ADMIN — OXYCODONE HYDROCHLORIDE AND ACETAMINOPHEN 500 MG: 500 TABLET ORAL at 09:10

## 2024-08-02 RX ADMIN — DULOXETINE HYDROCHLORIDE 60 MG: 30 CAPSULE, DELAYED RELEASE ORAL at 09:09

## 2024-08-02 RX ADMIN — ATORVASTATIN CALCIUM 40 MG: 20 TABLET, FILM COATED ORAL at 21:28

## 2024-08-02 RX ADMIN — TRIAMCINOLONE ACETONIDE: 1 CREAM TOPICAL at 12:53

## 2024-08-02 RX ADMIN — FOLIC ACID TAB 400 MCG 400 MCG: 400 TAB at 09:10

## 2024-08-02 ASSESSMENT — PAIN DESCRIPTION - DESCRIPTORS
DESCRIPTORS: ACHING;NAGGING
DESCRIPTORS: ACHING

## 2024-08-02 ASSESSMENT — PAIN DESCRIPTION - LOCATION
LOCATION: BACK
LOCATION: BACK

## 2024-08-02 ASSESSMENT — PAIN SCALES - GENERAL
PAINLEVEL_OUTOF10: 6
PAINLEVEL_OUTOF10: 4
PAINLEVEL_OUTOF10: 3
PAINLEVEL_OUTOF10: 6

## 2024-08-02 ASSESSMENT — PAIN DESCRIPTION - ORIENTATION: ORIENTATION: POSTERIOR

## 2024-08-02 ASSESSMENT — PAIN SCALES - WONG BAKER: WONGBAKER_NUMERICALRESPONSE: HURTS A LITTLE BIT

## 2024-08-02 NOTE — PROGRESS NOTES
Hospitalist Progress Note      NAME:  Sabina Heath   :  1945  MRM:  [id number]    Date/Time: 2024  9:57 AM           Assessment / Plan:     79-year-old female with a history of A-fib, diabetes, rheumatoid arthritis, hyperlipidemia, renal failure comes to the hospital after having a fall at home.  Patient was found to have a T7 compression fracture.  Patient is admitted for pain control, PT OT evaluation and IR consult.     #T7 compression fracture  #Fall  Pain control, PT OT  Interventional radiology consult for vertebroplasty  Will obtain MRI, bone scan today      # Atrial fibrillation  Patient has a pacemaker and defibrillator.    Last EF 40 to 45% with mild MVR.  Continue metoprolol    Holding Xarelto     #CHF  -Not in exacerbation   Last known EF 40 to 45% with mild MVR.  Plan  -Will restart patient's home medications, continue patient on Lasix p.o. 20, metoprolol 25  -Repeat TTE    # Yuly-Yuly disease  -Continue topical triamcinolone     #Diabetes  Patient is taking metformin at home.   Start sliding scale insulin.     #Rheumatoid arthritis  Patient is on Plaquenil and methotrexate and folic acid.     #Hyperlipidemia  Continue Lipitor 40 mg nightly     # Thrombocytopenia  Monitor platelets closely     #  Hyperkalemia  Holding Entresto and spironolactone.     # Renal failure  Unknown baseline  If chronic patient is in CKD stage IV.  Currently holding lisinopril          #BMI (Calculated): 39.16    I have personally reviewed the radiographs, laboratory data in Epic and decisions and statements above are based partially on this personal interpretation.                 Care Plan discussed with: Patient    Discussed:  Care Plan    Prophylaxis: SCD    Disposition:  SNF/LTC           ___________________________________________________    Attending Physician: Dangelo Galindo MD        Subjective:     Chief Complaint: T4 fracture    ROS:  (bold if positive, if negative)    Tolerating PT  Tolerating

## 2024-08-02 NOTE — PROGRESS NOTES
Physical Therapy orders acknowledged, chart reviewed and discussed with nurse patient have acute T7 endplate compression fracture. IR consulted for possible kyphoplasty/vertebroplasty. We will defer for now and continue to follow up with the patient for therapy.

## 2024-08-02 NOTE — PROGRESS NOTES
Physician Progress Note      PATIENT:               GAYLE BARRERA  CSN #:                  [id number]  :                       1945  ADMIT DATE:       2024 2:25 PM  DISCH DATE:  RESPONDING  PROVIDER #:        Dangelo Galindo MD          QUERY TEXT:    Good Afternoon    This patient admitted for T-7 compression fracture    The medical record also known \"CHF, Not in exacerbation, last known EF of   40-45%.    If possible, please document in progress notes and discharge summary further   specificity regarding the type and acuity of CHF:    The medical record reflects the following:  Risk Factors: Known HTN, CHF, A-fib  Clinical Indicators: Md notes \"CHF, not in exacerbation with known EF of   40-45%  Treatment:  Will restart pt home meds on Lasix and Metoprolol PAPI    Thank you  JAZMINE Chambers,RN, CPQ< CCDS, SMART  Options provided:  -- Chronic Systolic CHF/HFrEF  -- Chronic Diastolic CHF/HFpEF  -- Chronic Systolic and Diastolic CHF  -- Other - I will add my own diagnosis  -- Disagree - Not applicable / Not valid  -- Disagree - Clinically unable to determine / Unknown  -- Refer to Clinical Documentation Reviewer    PROVIDER RESPONSE TEXT:    This patient has chronic diastolic CHF/HFpEF.    Query created by: Jeanne Venegas on 2024 1:36 PM      Electronically signed by:  Dangelo Galindo MD 2024 2:50 PM

## 2024-08-02 NOTE — CARE COORDINATION
Care Management Initial Assessment  8/2/2024 10:04 AM  If patient is discharged prior to next notation, then this note serves as note for discharge by case management.    Reason for Admission:   JULY (acute kidney injury) ([facility]) [N17.9]  Fall, initial encounter [W19.XXXA]  Compression fracture of T7 vertebra, initial encounter ([facility]) [S22.060A]         Patient Admission Status: Inpatient  RUR: Readmission Risk Score: 11.7    Hospitalization in the last 30 days (Readmission):  No        Advance Care Planning:  Code Status: Full Code  Primary Healthcare Decision Maker: (P) Legal Next of Kin        __________________________________________________________________________  Assessment:      08/02/24 1000   Service Assessment   Patient Orientation Alert and Oriented   Cognition Alert   History Provided By Patient   Primary Caregiver Self   Support Systems Children;Family Members   Patient's Healthcare Decision Maker is: Legal Next of Kin   PCP Verified by CM Yes  (Yaz Rainey MD)   Last Visit to PCP Within last 3 months   Prior Functional Level Independent in ADLs/IADLs   Current Functional Level Independent in ADLs/IADLs   Can patient return to prior living arrangement Yes   Ability to make needs known: Good   Family able to assist with home care needs: Yes   Would you like for me to discuss the discharge plan with any other family members/significant others, and if so, who? Yes  (son and daughter in law)   Financial Resources Medicare   Community Resources None   Social/Functional History   Lives With Alone   Type of Home Senior housing apartment   Home Layout One level   Home Access Elevator;Level entry   Bathroom Toilet Handicap height   Bathroom Equipment Built-in shower seat;Grab bars in shower   Home Equipment Rollator;Wheelchair - Electric   Receives Help From Family   ADL Assistance Independent   Homemaking Assistance Independent   Ambulation Assistance Needs assistance  (uses rollator and WC)

## 2024-08-02 NOTE — PROGRESS NOTES
Occupational Therapy:  08/02/24    Orders received and appreciated, chart reviewed. Noted pt admitted after GLF, found to have acute T7 endplate compression fracture. IR consulted for possible kyphoplasty/vertebroplasty with additional imaging ordered. Will await determination of treatment plan prior to initiating OT evaluation.     Thank you,  Casi Washington, OTR/L

## 2024-08-02 NOTE — PROGRESS NOTES
Phone call to 4th floor to call report for patient transferring into 414. Spoke with staff who answered phone, notified report was being called for 414. Waited on phone approximately five minutes. No one came back to phone.  JAZ Dickey

## 2024-08-02 NOTE — PROGRESS NOTES
Mobility. Patient asking if she can get out of bed. Secure text page to Dr. Galindo who defers activity progression to Physical Therapist. Patient transferred to new room 414. Phone call to 4th floor to update JAZ Wang. No answer. Plan: Activity per Physical Therapy.  Phone call to IR inquiring about plan. Spoke with Yvette who states patient currently not on schedule.  JAZ Dickey

## 2024-08-02 NOTE — PROGRESS NOTES
Received report from off going JAZ Bourne who reported on patients history, reason for admission, course of events and plan for MRI. Patient has a [employer] Pacemaker, card is on her person, patient is filling out MRI checklist.  JAZ Dickey

## 2024-08-03 ENCOUNTER — APPOINTMENT (OUTPATIENT)
Facility: HOSPITAL | Age: 79
DRG: 516 | End: 2024-08-03
Payer: MEDICARE

## 2024-08-03 LAB
GLUCOSE BLD STRIP.AUTO-MCNC: 101 MG/DL (ref 65–117)
GLUCOSE BLD STRIP.AUTO-MCNC: 124 MG/DL (ref 65–117)
GLUCOSE BLD STRIP.AUTO-MCNC: 164 MG/DL (ref 65–117)
GLUCOSE BLD STRIP.AUTO-MCNC: 178 MG/DL (ref 65–117)
SERVICE CMNT-IMP: ABNORMAL
SERVICE CMNT-IMP: NORMAL

## 2024-08-03 PROCEDURE — 97530 THERAPEUTIC ACTIVITIES: CPT

## 2024-08-03 PROCEDURE — 2580000003 HC RX 258: Performed by: HOSPITALIST

## 2024-08-03 PROCEDURE — 97161 PT EVAL LOW COMPLEX 20 MIN: CPT

## 2024-08-03 PROCEDURE — 1100000000 HC RM PRIVATE

## 2024-08-03 PROCEDURE — 97165 OT EVAL LOW COMPLEX 30 MIN: CPT

## 2024-08-03 PROCEDURE — 6370000000 HC RX 637 (ALT 250 FOR IP): Performed by: STUDENT IN AN ORGANIZED HEALTH CARE EDUCATION/TRAINING PROGRAM

## 2024-08-03 PROCEDURE — 6370000000 HC RX 637 (ALT 250 FOR IP): Performed by: HOSPITALIST

## 2024-08-03 PROCEDURE — 82962 GLUCOSE BLOOD TEST: CPT

## 2024-08-03 PROCEDURE — 94761 N-INVAS EAR/PLS OXIMETRY MLT: CPT

## 2024-08-03 PROCEDURE — 97116 GAIT TRAINING THERAPY: CPT

## 2024-08-03 RX ORDER — POLYETHYLENE GLYCOL 3350 17 G/17G
17 POWDER, FOR SOLUTION ORAL DAILY
Status: DISCONTINUED | OUTPATIENT
Start: 2024-08-03 | End: 2024-08-12 | Stop reason: HOSPADM

## 2024-08-03 RX ADMIN — TRIAMCINOLONE ACETONIDE: 1 CREAM TOPICAL at 09:30

## 2024-08-03 RX ADMIN — SACUBITRIL AND VALSARTAN 1 TABLET: 24; 26 TABLET, FILM COATED ORAL at 09:33

## 2024-08-03 RX ADMIN — SODIUM CHLORIDE, PRESERVATIVE FREE 10 ML: 5 INJECTION INTRAVENOUS at 21:38

## 2024-08-03 RX ADMIN — OXYCODONE AND ACETAMINOPHEN 1 TABLET: 7.5; 325 TABLET ORAL at 23:53

## 2024-08-03 RX ADMIN — FOLIC ACID TAB 400 MCG 400 MCG: 400 TAB at 09:33

## 2024-08-03 RX ADMIN — Medication 400 MG: at 09:28

## 2024-08-03 RX ADMIN — DULOXETINE HYDROCHLORIDE 60 MG: 30 CAPSULE, DELAYED RELEASE ORAL at 09:28

## 2024-08-03 RX ADMIN — METOPROLOL SUCCINATE 50 MG: 50 TABLET, EXTENDED RELEASE ORAL at 09:28

## 2024-08-03 RX ADMIN — PANTOPRAZOLE SODIUM 40 MG: 40 TABLET, DELAYED RELEASE ORAL at 06:19

## 2024-08-03 RX ADMIN — OXYCODONE AND ACETAMINOPHEN 1 TABLET: 7.5; 325 TABLET ORAL at 09:39

## 2024-08-03 RX ADMIN — SODIUM CHLORIDE: 9 INJECTION, SOLUTION INTRAVENOUS at 06:19

## 2024-08-03 RX ADMIN — POLYETHYLENE GLYCOL 3350 17 G: 17 POWDER, FOR SOLUTION ORAL at 09:29

## 2024-08-03 RX ADMIN — SODIUM CHLORIDE, PRESERVATIVE FREE 10 ML: 5 INJECTION INTRAVENOUS at 09:29

## 2024-08-03 RX ADMIN — SPIRONOLACTONE 25 MG: 25 TABLET ORAL at 09:29

## 2024-08-03 RX ADMIN — ATORVASTATIN CALCIUM 40 MG: 20 TABLET, FILM COATED ORAL at 21:37

## 2024-08-03 RX ADMIN — TRIAMCINOLONE ACETONIDE: 1 CREAM TOPICAL at 21:40

## 2024-08-03 RX ADMIN — HYDROXYCHLOROQUINE SULFATE 200 MG: 200 TABLET ORAL at 09:28

## 2024-08-03 RX ADMIN — ACETAMINOPHEN 650 MG: 325 TABLET ORAL at 15:09

## 2024-08-03 RX ADMIN — HYDROXYCHLOROQUINE SULFATE 200 MG: 200 TABLET ORAL at 21:30

## 2024-08-03 RX ADMIN — OXYCODONE HYDROCHLORIDE AND ACETAMINOPHEN 500 MG: 500 TABLET ORAL at 09:29

## 2024-08-03 RX ADMIN — FUROSEMIDE 20 MG: 20 TABLET ORAL at 09:28

## 2024-08-03 ASSESSMENT — PAIN SCALES - GENERAL
PAINLEVEL_OUTOF10: 3
PAINLEVEL_OUTOF10: 6

## 2024-08-03 NOTE — PLAN OF CARE
Problem: Occupational Therapy - Adult  Goal: By Discharge: Performs self-care activities at highest level of function for planned discharge setting.  See evaluation for individualized goals.  Description: FUNCTIONAL STATUS PRIOR TO ADMISSION:  Per pt report, pt is MI for self care and functional transfers/mobility with rollator.     HOME SUPPORT: Patient lived alone with no local support. Pt did state her son and daughter in law live close by and may be able to go stay with them for a week or two.     Occupational Therapy Goals:  Initiated 8/3/2024  1.  Patient will perform grooming with Modified [place] within 7 day(s).  2.  Patient will perform upper body dressing with Modified [place] within 7 day(s).  3.  Patient will perform lower body dressing with Modified [place] with dressing aids within 7 day(s).  4.  Patient will perform toilet transfers with Modified [place]  within 7 day(s).  5.  Patient will perform all aspects of toileting with Modified [place] within 7 day(s).  6.  Patient will participate in upper extremity therapeutic exercise/activities with Modified [place] for 10 minutes within 7 day(s).    7.  Patient will verbalize/demonstrate 3/3 back precautions during ADL tasks without cues within 7 days.      Outcome: Progressing     OCCUPATIONAL THERAPY EVALUATION    Patient: Sabina Heath (79 y.o. female)  Date: 8/3/2024  Primary Diagnosis: JULY (acute kidney injury) ([facility]) [N17.9]  Fall, initial encounter [W19.XXXA]  Compression fracture of T7 vertebra, initial encounter ([facility]) [S22.060A]         Precautions: back precautions for T7 compression fracture    ASSESSMENT :  Patient received semi supine in bed A&OX4 and agreeable for OT/PT eval/tx  as patient is s/p fall at home and now with acute T& compression fracture and age indeterminate posterior left 12th rib fracture. . Per pt report, pt lives alone in a senior living apartments and is MI for self care and functional  above components, the patient evaluation is determined to be of the following complexity level: Low

## 2024-08-03 NOTE — PLAN OF CARE
Problem: Physical Therapy - Adult  Goal: By Discharge: Performs mobility at highest level of function for planned discharge setting.  See evaluation for individualized goals.  Description: FUNCTIONAL STATUS PRIOR TO ADMISSION: Patient was modified independent using a rollator for functional mobility. Two or three falls falls per year reported.     HOME SUPPORT PRIOR TO ADMISSION: The patient lived alone with no local support. Does have a son nearby that she can stay with for a little while.    Physical Therapy Goals  Initiated 8/3/2024  1.  Patient will move from supine to sit and sit to supine, scoot up and down, and roll side to side in bed with independence within 7 day(s).    2.  Patient will perform sit to stand with independence within 7 day(s).  3.  Patient will transfer from bed to chair and chair to bed with independence using the least restrictive device within 7 day(s).  4.  Patient will ambulate with modified independence for 100 feet with the least restrictive device within 7 day(s).       Outcome: Progressing   PHYSICAL THERAPY EVALUATION    Patient: Sabina Heath (79 y.o. female)  Date: 8/3/2024  Primary Diagnosis: JULY (acute kidney injury) ([facility]) [N17.9]  Fall, initial encounter [W19.XXXA]  Compression fracture of T7 vertebra, initial encounter ([facility]) [S22.060A]       Precautions:                        ASSESSMENT :   DEFICITS/IMPAIRMENTS:   The patient is limited by decreased functional mobility, independence in ADLs, high-level IADLs, ROM, body mechanics, endurance, balance, increased pain levels following admission for JUYL and fall 1 week ago. Imaging shows acute T7 end plate fracture. Awaiting MRI. Pt eager to mobilize OOB. She requires increased time and verbal cueing to maintain back precautions. Overall Min A for bed mobility, CGA-Min A to stand to rollator and CGA to ambulate 60ft with decreased gait speed. Pt's rollator brake broken further increasing fall risk. She reports having a power  [id number]. PMID: [id number]; PMCID: [id number].  4. Michele BROWNE, Nicolle S, Shara W, Shlel P. AM-PAC Short Forms Manual 4.0. Revised 2/2020.                                                                                                                                                                                                                               Pain Rating:  3/10   Pain Intervention(s):   pain is at a level acceptable to the patient    Activity Tolerance:   Good    After treatment:   Patient left in no apparent distress sitting up in chair, Call bell within reach, and Bed/ chair alarm activated    COMMUNICATION/EDUCATION:   The patient's plan of care was discussed with: registered nurse         Thank you for this referral.  Savi Emery, PT  Minutes: 32

## 2024-08-03 NOTE — PROGRESS NOTES
Hospitalist Progress Note      NAME:  Sabina Heath   :  1945  MRM:  [id number]    Date/Time: 8/3/2024  10:02 AM           Assessment / Plan:     79-year-old female with a history of A-fib, diabetes, rheumatoid arthritis, hyperlipidemia, renal failure comes to the hospital after having a fall at home.  Patient was found to have a T7 compression fracture.  Patient is admitted for pain control, PT OT evaluation and IR consult.     #T7 compression fracture  #Fall  Pain control, PT OT  Interventional radiology consult for vertebroplasty, most likely on Monday  Will obtain MRI, bone scan prior to procedure     # Atrial fibrillation  Patient has a pacemaker and defibrillator.    Last EF 40 to 45% with mild MVR, repeated echo was EF 50-55%  Plan  Continue metoprolol    Holding Xarelto     #CHF  -Not in exacerbation  Repeated TTE with EF 50-55%  Plan  -Will restart patient's home medications,  -Continue Entresto, spironolactone, metoprolol, Lasix    # Yuly-Yuly disease  -Continue topical triamcinolone     #Diabetes  Patient is taking metformin at home.   Continue sliding scale insulin.     #Rheumatoid arthritis  Patient is on Plaquenil and methotrexate and folic acid.     #Hyperlipidemia  Continue Lipitor 40 mg nightly     # Thrombocytopenia  Monitor platelets closely     # Renal failure  Unknown baseline  If chronic patient is in CKD stage IV.  Currently holding lisinopril          #BMI (Calculated): 39.16    I have personally reviewed the radiographs, laboratory data in Epic and decisions and statements above are based partially on this personal interpretation.                 Care Plan discussed with: Patient    Discussed:  Care Plan    Prophylaxis: SCD    Disposition:  SNF/LTC           ___________________________________________________    Attending Physician: Dangelo Galindo MD        Subjective:     Chief Complaint: T4 fracture    ROS:  (bold if positive, if negative)    Tolerating PT  Tolerating  IntraVENous Continuous PRN    insulin lispro (HUMALOG,ADMELOG) injection vial 0-4 Units  0-4 Units SubCUTAneous TID WC    insulin lispro (HUMALOG,ADMELOG) injection vial 0-4 Units  0-4 Units SubCUTAneous Nightly            Lab Review:     Recent Labs     08/01/24  1445 08/02/24  0440   WBC 6.1 4.8   HGB 12.3 13.1   HCT 37.6 39.4   * 143*       Recent Labs     08/01/24  1445 08/02/24  0440    138   K 5.4* 4.1   CL 99 104   CO2 27 27   BUN 28* 29*   ALT 17  --        No components found for: \"GLPOC\"

## 2024-08-03 NOTE — PLAN OF CARE
Problem: Chronic Conditions and Co-morbidities  Goal: Patient's chronic conditions and co-morbidity symptoms are monitored and maintained or improved  Outcome: Progressing     Problem: Discharge Planning  Goal: Discharge to home or other facility with appropriate resources  Outcome: Progressing     Problem: Safety - Adult  Goal: Free from fall injury  Outcome: Progressing     Problem: Pain  Goal: Verbalizes/displays adequate comfort level or baseline comfort level  Outcome: Progressing    Plan is for possible kyphoplasty on Monday. MRI pending.

## 2024-08-04 LAB
ANION GAP SERPL CALC-SCNC: 3 MMOL/L (ref 5–15)
BUN SERPL-MCNC: 30 MG/DL (ref 6–20)
BUN/CREAT SERPL: 20 (ref 12–20)
CALCIUM SERPL-MCNC: 9 MG/DL (ref 8.5–10.1)
CHLORIDE SERPL-SCNC: 107 MMOL/L (ref 97–108)
CO2 SERPL-SCNC: 27 MMOL/L (ref 21–32)
CREAT SERPL-MCNC: 1.48 MG/DL (ref 0.55–1.02)
ERYTHROCYTE [DISTWIDTH] IN BLOOD BY AUTOMATED COUNT: 13.2 % (ref 11.5–14.5)
GLUCOSE BLD STRIP.AUTO-MCNC: 134 MG/DL (ref 65–117)
GLUCOSE BLD STRIP.AUTO-MCNC: 170 MG/DL (ref 65–117)
GLUCOSE BLD STRIP.AUTO-MCNC: 178 MG/DL (ref 65–117)
GLUCOSE BLD STRIP.AUTO-MCNC: 194 MG/DL (ref 65–117)
GLUCOSE SERPL-MCNC: 158 MG/DL (ref 65–100)
HCT VFR BLD AUTO: 35 % (ref 35–47)
HGB BLD-MCNC: 11.4 G/DL (ref 11.5–16)
MCH RBC QN AUTO: 31.7 PG (ref 26–34)
MCHC RBC AUTO-ENTMCNC: 32.6 G/DL (ref 30–36.5)
MCV RBC AUTO: 97.2 FL (ref 80–99)
NRBC # BLD: 0 K/UL (ref 0–0.01)
NRBC BLD-RTO: 0 PER 100 WBC
PLATELET # BLD AUTO: 112 K/UL (ref 150–400)
PMV BLD AUTO: 10.5 FL (ref 8.9–12.9)
POTASSIUM SERPL-SCNC: 4.2 MMOL/L (ref 3.5–5.1)
RBC # BLD AUTO: 3.6 M/UL (ref 3.8–5.2)
SERVICE CMNT-IMP: ABNORMAL
SODIUM SERPL-SCNC: 137 MMOL/L (ref 136–145)
WBC # BLD AUTO: 5.3 K/UL (ref 3.6–11)

## 2024-08-04 PROCEDURE — 94761 N-INVAS EAR/PLS OXIMETRY MLT: CPT

## 2024-08-04 PROCEDURE — 85027 COMPLETE CBC AUTOMATED: CPT

## 2024-08-04 PROCEDURE — 6370000000 HC RX 637 (ALT 250 FOR IP): Performed by: HOSPITALIST

## 2024-08-04 PROCEDURE — 82962 GLUCOSE BLOOD TEST: CPT

## 2024-08-04 PROCEDURE — 2580000003 HC RX 258: Performed by: HOSPITALIST

## 2024-08-04 PROCEDURE — 36415 COLL VENOUS BLD VENIPUNCTURE: CPT

## 2024-08-04 PROCEDURE — 80048 BASIC METABOLIC PNL TOTAL CA: CPT

## 2024-08-04 PROCEDURE — 6370000000 HC RX 637 (ALT 250 FOR IP): Performed by: STUDENT IN AN ORGANIZED HEALTH CARE EDUCATION/TRAINING PROGRAM

## 2024-08-04 PROCEDURE — 1100000000 HC RM PRIVATE

## 2024-08-04 RX ORDER — LIDOCAINE 4 G/G
1 PATCH TOPICAL DAILY
Status: DISCONTINUED | OUTPATIENT
Start: 2024-08-04 | End: 2024-08-12 | Stop reason: HOSPADM

## 2024-08-04 RX ADMIN — Medication 400 MG: at 09:50

## 2024-08-04 RX ADMIN — FUROSEMIDE 20 MG: 20 TABLET ORAL at 09:51

## 2024-08-04 RX ADMIN — HYDROXYCHLOROQUINE SULFATE 200 MG: 200 TABLET ORAL at 20:20

## 2024-08-04 RX ADMIN — METOPROLOL SUCCINATE 50 MG: 50 TABLET, EXTENDED RELEASE ORAL at 09:50

## 2024-08-04 RX ADMIN — OXYCODONE HYDROCHLORIDE AND ACETAMINOPHEN 500 MG: 500 TABLET ORAL at 09:51

## 2024-08-04 RX ADMIN — OXYCODONE AND ACETAMINOPHEN 1 TABLET: 7.5; 325 TABLET ORAL at 18:16

## 2024-08-04 RX ADMIN — SODIUM CHLORIDE, PRESERVATIVE FREE 10 ML: 5 INJECTION INTRAVENOUS at 09:53

## 2024-08-04 RX ADMIN — TRIAMCINOLONE ACETONIDE: 1 CREAM TOPICAL at 13:01

## 2024-08-04 RX ADMIN — DULOXETINE HYDROCHLORIDE 60 MG: 30 CAPSULE, DELAYED RELEASE ORAL at 09:50

## 2024-08-04 RX ADMIN — ATORVASTATIN CALCIUM 40 MG: 20 TABLET, FILM COATED ORAL at 20:20

## 2024-08-04 RX ADMIN — SPIRONOLACTONE 25 MG: 25 TABLET ORAL at 09:54

## 2024-08-04 RX ADMIN — FOLIC ACID TAB 400 MCG 400 MCG: 400 TAB at 10:34

## 2024-08-04 RX ADMIN — SACUBITRIL AND VALSARTAN 1 TABLET: 24; 26 TABLET, FILM COATED ORAL at 10:34

## 2024-08-04 RX ADMIN — POLYETHYLENE GLYCOL 3350 17 G: 17 POWDER, FOR SOLUTION ORAL at 09:51

## 2024-08-04 RX ADMIN — SACUBITRIL AND VALSARTAN 1 TABLET: 24; 26 TABLET, FILM COATED ORAL at 20:20

## 2024-08-04 RX ADMIN — PANTOPRAZOLE SODIUM 40 MG: 40 TABLET, DELAYED RELEASE ORAL at 06:24

## 2024-08-04 RX ADMIN — TRIAMCINOLONE ACETONIDE: 1 CREAM TOPICAL at 20:24

## 2024-08-04 RX ADMIN — SODIUM CHLORIDE, PRESERVATIVE FREE 10 ML: 5 INJECTION INTRAVENOUS at 20:27

## 2024-08-04 RX ADMIN — HYDROXYCHLOROQUINE SULFATE 200 MG: 200 TABLET ORAL at 09:51

## 2024-08-04 RX ADMIN — OXYCODONE AND ACETAMINOPHEN 1 TABLET: 7.5; 325 TABLET ORAL at 09:51

## 2024-08-04 ASSESSMENT — PAIN SCALES - WONG BAKER: WONGBAKER_NUMERICALRESPONSE: HURTS A LITTLE BIT

## 2024-08-04 ASSESSMENT — PAIN SCALES - GENERAL
PAINLEVEL_OUTOF10: 0
PAINLEVEL_OUTOF10: 6
PAINLEVEL_OUTOF10: 4

## 2024-08-04 NOTE — PLAN OF CARE
Problem: Chronic Conditions and Co-morbidities  Goal: Patient's chronic conditions and co-morbidity symptoms are monitored and maintained or improved  Outcome: Progressing     Problem: Discharge Planning  Goal: Discharge to home or other facility with appropriate resources  Outcome: Progressing     Problem: Safety - Adult  Goal: Free from fall injury  Outcome: Progressing     Problem: Pain  Goal: Verbalizes/displays adequate comfort level or baseline comfort level  Outcome: Progressing  Flowsheets (Taken 8/4/2024 1910)  Verbalizes/displays adequate comfort level or baseline comfort level:   Encourage patient to monitor pain and request assistance   Assess pain using appropriate pain scale   Administer analgesics based on type and severity of pain and evaluate response   Implement non-pharmacological measures as appropriate and evaluate response   Consider cultural and social influences on pain and pain management   Notify Licensed Independent Practitioner if interventions unsuccessful or patient reports new pain     Problem: Skin/Tissue Integrity  Goal: Absence of new skin breakdown  Description: 1.  Monitor for areas of redness and/or skin breakdown  2.  Assess vascular access sites hourly  3.  Every 4-6 hours minimum:  Change oxygen saturation probe site  4.  Every 4-6 hours:  If on nasal continuous positive airway pressure, respiratory therapy assess nares and determine need for appliance change or resting period.  Outcome: Progressing

## 2024-08-04 NOTE — PROGRESS NOTES
Hospitalist Progress Note      NAME:  Sabina Heath   :  1945  MRM:  [id number]    Date/Time: 2024  9:51 AM           Assessment / Plan:     79-year-old female with a history of A-fib, diabetes, rheumatoid arthritis, hyperlipidemia, renal failure comes to the hospital after having a fall at home.  Patient was found to have a T7 compression fracture.  Patient is admitted for pain control, PT OT evaluation and IR consult.     #T7 compression fracture  #Fall  Pain control, PT OT  Interventional radiology consult for vertebroplasty, most likely on Monday  Will obtain MRI, bone scan prior to procedure [ currently pending clearance for patient's pacemaker ]      # Atrial fibrillation  Patient has a pacemaker and defibrillator.    Last EF 40 to 45% with mild MVR, repeated echo was EF 50-55%  Plan  Continue metoprolol    Holding Xarelto     #CHF  -Not in exacerbation  Repeated TTE with EF 50-55%  Plan  -Will restart patient's home medications,  -Continue Entresto, spironolactone, metoprolol, Lasix    # Yuly-Yuly disease  -Continue topical triamcinolone     #Diabetes  Patient is taking metformin at home.   Continue sliding scale insulin.     #Rheumatoid arthritis  Patient is on Plaquenil and methotrexate and folic acid.     #Hyperlipidemia  Continue Lipitor 40 mg nightly     # Thrombocytopenia  Monitor platelets closely     # Renal failure, been stable, CKD stage IV.  Unknown baseline  Daily labs  FU OP     #Mood disorder   -Continue home Cymbalta        #BMI (Calculated): 39.16    I have personally reviewed the radiographs, laboratory data in Epic and decisions and statements above are based partially on this personal interpretation.                 Care Plan discussed with: Patient    Discussed:  Care Plan    Prophylaxis: SCD    Disposition:  SNF/LTC           ___________________________________________________    Attending Physician: Dangelo Galindo MD        Subjective:     Chief Complaint: T4  fracture    ROS:  (bold if positive, if negative)    Tolerating PT  Tolerating Diet          Objective:   -No events reported overnight for the patient, she reports pain is well-controlled, PT has evaluated the patient    -Still pending MRI and bone density scan, awaiting clearance for the pacemaker     Vitals:          Last 24hrs VS reviewed since prior progress note. Most recent are:    Vitals:    08/04/24 0809   BP: (!) 117/54   Pulse: 78   Resp: 12   Temp: 97.7 °F (36.5 °C)   SpO2: 97%     SpO2 Readings from Last 6 Encounters:   08/04/24 97%        No intake or output data in the 24 hours ending 08/04/24 0951         Exam:     Physical Exam:    Gen: Very pleasant elderly chronically ill female in no acute distress  HEENT:  Pink conjunctivae, PERRL, hearing intact to voice, moist mucous membranes  Neck:  Supple, without masses, thyroid non-tender  Resp:  No accessory muscle use, clear breath sounds without wheezes rales or rhonchi  Card:  No murmurs, normal S1, S2 without thrills, bruits or peripheral edema  Abd: Obese soft, non-tender, non-distended, normoactive bowel sounds are present  Musc:  No cyanosis or clubbing  Skin: Yuly-Yuly disease with notable subcutaneous hematoma bilateral upper extremities, stable, skin well moisturized     Neuro:  Cranial nerves 3-12 are grossly intact,  strength is 5/5 bilaterally and dorsi / plantarflexion is 5/5 bilaterally, follows commands appropriately  Psych:  Good insight, oriented to person, place and time, alert       Telemetry reviewed:   normal sinus rhythm    Medications Reviewed: (see below)    Lab Data Reviewed: (see below)    ______________________________________________________________________    Medications:     Current Facility-Administered Medications   Medication Dose Route Frequency    lidocaine 4 % external patch 1 patch  1 patch TransDERmal Daily    polyethylene glycol (GLYCOLAX) packet 17 g  17 g Oral Daily    triamcinolone (KENALOG) 0.1 % cream

## 2024-08-04 NOTE — PLAN OF CARE
Problem: Chronic Conditions and Co-morbidities  Goal: Patient's chronic conditions and co-morbidity symptoms are monitored and maintained or improved  Outcome: Progressing  Flowsheets (Taken 8/1/2024 2112 by Tayla Hdez, RN)  Care Plan - Patient's Chronic Conditions and Co-Morbidity Symptoms are Monitored and Maintained or Improved: Monitor and assess patient's chronic conditions and comorbid symptoms for stability, deterioration, or improvement

## 2024-08-05 LAB
ANION GAP SERPL CALC-SCNC: 5 MMOL/L (ref 5–15)
APTT PPP: 25.2 SEC (ref 22.1–31)
BUN SERPL-MCNC: 28 MG/DL (ref 6–20)
BUN/CREAT SERPL: 19 (ref 12–20)
CALCIUM SERPL-MCNC: 9 MG/DL (ref 8.5–10.1)
CHLORIDE SERPL-SCNC: 107 MMOL/L (ref 97–108)
CO2 SERPL-SCNC: 25 MMOL/L (ref 21–32)
COMMENT:: NORMAL
CREAT SERPL-MCNC: 1.48 MG/DL (ref 0.55–1.02)
ERYTHROCYTE [DISTWIDTH] IN BLOOD BY AUTOMATED COUNT: 13.3 % (ref 11.5–14.5)
ERYTHROCYTE [DISTWIDTH] IN BLOOD BY AUTOMATED COUNT: 13.3 % (ref 11.5–14.5)
GLUCOSE BLD STRIP.AUTO-MCNC: 128 MG/DL (ref 65–117)
GLUCOSE BLD STRIP.AUTO-MCNC: 159 MG/DL (ref 65–117)
GLUCOSE BLD STRIP.AUTO-MCNC: 177 MG/DL (ref 65–117)
GLUCOSE BLD STRIP.AUTO-MCNC: 190 MG/DL (ref 65–117)
GLUCOSE SERPL-MCNC: 178 MG/DL (ref 65–100)
HCT VFR BLD AUTO: 34.9 % (ref 35–47)
HCT VFR BLD AUTO: 36.5 % (ref 35–47)
HGB BLD-MCNC: 11.5 G/DL (ref 11.5–16)
HGB BLD-MCNC: 12 G/DL (ref 11.5–16)
INR PPP: 1.1 (ref 0.9–1.1)
MCH RBC QN AUTO: 31.3 PG (ref 26–34)
MCH RBC QN AUTO: 31.5 PG (ref 26–34)
MCHC RBC AUTO-ENTMCNC: 32.9 G/DL (ref 30–36.5)
MCHC RBC AUTO-ENTMCNC: 33 G/DL (ref 30–36.5)
MCV RBC AUTO: 95.3 FL (ref 80–99)
MCV RBC AUTO: 95.6 FL (ref 80–99)
NRBC # BLD: 0 K/UL (ref 0–0.01)
NRBC # BLD: 0 K/UL (ref 0–0.01)
NRBC BLD-RTO: 0 PER 100 WBC
NRBC BLD-RTO: 0 PER 100 WBC
PLATELET # BLD AUTO: 123 K/UL (ref 150–400)
PLATELET # BLD AUTO: 129 K/UL (ref 150–400)
PMV BLD AUTO: 10.5 FL (ref 8.9–12.9)
PMV BLD AUTO: 9.9 FL (ref 8.9–12.9)
POTASSIUM SERPL-SCNC: 4.5 MMOL/L (ref 3.5–5.1)
PROTHROMBIN TIME: 10.9 SEC (ref 9–11.1)
RBC # BLD AUTO: 3.65 M/UL (ref 3.8–5.2)
RBC # BLD AUTO: 3.83 M/UL (ref 3.8–5.2)
SERVICE CMNT-IMP: ABNORMAL
SODIUM SERPL-SCNC: 137 MMOL/L (ref 136–145)
SPECIMEN HOLD: NORMAL
THERAPEUTIC RANGE: NORMAL SECS (ref 58–77)
UFH PPP CHRO-ACNC: <0.1 IU/ML
WBC # BLD AUTO: 5.4 K/UL (ref 3.6–11)
WBC # BLD AUTO: 5.5 K/UL (ref 3.6–11)

## 2024-08-05 PROCEDURE — 97535 SELF CARE MNGMENT TRAINING: CPT

## 2024-08-05 PROCEDURE — 6370000000 HC RX 637 (ALT 250 FOR IP): Performed by: HOSPITALIST

## 2024-08-05 PROCEDURE — 85610 PROTHROMBIN TIME: CPT

## 2024-08-05 PROCEDURE — 97116 GAIT TRAINING THERAPY: CPT

## 2024-08-05 PROCEDURE — 36415 COLL VENOUS BLD VENIPUNCTURE: CPT

## 2024-08-05 PROCEDURE — 85730 THROMBOPLASTIN TIME PARTIAL: CPT

## 2024-08-05 PROCEDURE — 2580000003 HC RX 258: Performed by: HOSPITALIST

## 2024-08-05 PROCEDURE — 85520 HEPARIN ASSAY: CPT

## 2024-08-05 PROCEDURE — 6360000002 HC RX W HCPCS: Performed by: STUDENT IN AN ORGANIZED HEALTH CARE EDUCATION/TRAINING PROGRAM

## 2024-08-05 PROCEDURE — 6370000000 HC RX 637 (ALT 250 FOR IP): Performed by: STUDENT IN AN ORGANIZED HEALTH CARE EDUCATION/TRAINING PROGRAM

## 2024-08-05 PROCEDURE — 1100000000 HC RM PRIVATE

## 2024-08-05 PROCEDURE — 97530 THERAPEUTIC ACTIVITIES: CPT

## 2024-08-05 PROCEDURE — 94761 N-INVAS EAR/PLS OXIMETRY MLT: CPT

## 2024-08-05 PROCEDURE — 82962 GLUCOSE BLOOD TEST: CPT

## 2024-08-05 PROCEDURE — 80048 BASIC METABOLIC PNL TOTAL CA: CPT

## 2024-08-05 PROCEDURE — 85027 COMPLETE CBC AUTOMATED: CPT

## 2024-08-05 RX ORDER — HEPARIN SODIUM 1000 [USP'U]/ML
4000 INJECTION, SOLUTION INTRAVENOUS; SUBCUTANEOUS ONCE
Status: DISCONTINUED | OUTPATIENT
Start: 2024-08-05 | End: 2024-08-05

## 2024-08-05 RX ORDER — HEPARIN SODIUM 1000 [USP'U]/ML
2000 INJECTION, SOLUTION INTRAVENOUS; SUBCUTANEOUS PRN
Status: DISCONTINUED | OUTPATIENT
Start: 2024-08-05 | End: 2024-08-07

## 2024-08-05 RX ORDER — HEPARIN SODIUM 1000 [USP'U]/ML
4000 INJECTION, SOLUTION INTRAVENOUS; SUBCUTANEOUS PRN
Status: DISCONTINUED | OUTPATIENT
Start: 2024-08-05 | End: 2024-08-07

## 2024-08-05 RX ORDER — HEPARIN SODIUM 10000 [USP'U]/100ML
5-30 INJECTION, SOLUTION INTRAVENOUS CONTINUOUS
Status: DISCONTINUED | OUTPATIENT
Start: 2024-08-05 | End: 2024-08-05

## 2024-08-05 RX ORDER — HEPARIN SODIUM 10000 [USP'U]/100ML
5-30 INJECTION, SOLUTION INTRAVENOUS CONTINUOUS
Status: DISCONTINUED | OUTPATIENT
Start: 2024-08-05 | End: 2024-08-07

## 2024-08-05 RX ORDER — HEPARIN SODIUM 5000 [USP'U]/ML
5000 INJECTION, SOLUTION INTRAVENOUS; SUBCUTANEOUS EVERY 8 HOURS SCHEDULED
Status: DISCONTINUED | OUTPATIENT
Start: 2024-08-05 | End: 2024-08-05

## 2024-08-05 RX ADMIN — HEPARIN SODIUM 9 UNITS/KG/HR: 10000 INJECTION, SOLUTION INTRAVENOUS at 15:31

## 2024-08-05 RX ADMIN — FOLIC ACID TAB 400 MCG 400 MCG: 400 TAB at 08:32

## 2024-08-05 RX ADMIN — METOPROLOL SUCCINATE 50 MG: 50 TABLET, EXTENDED RELEASE ORAL at 08:18

## 2024-08-05 RX ADMIN — OXYCODONE AND ACETAMINOPHEN 1 TABLET: 7.5; 325 TABLET ORAL at 15:23

## 2024-08-05 RX ADMIN — TRIAMCINOLONE ACETONIDE: 1 CREAM TOPICAL at 23:00

## 2024-08-05 RX ADMIN — OXYCODONE AND ACETAMINOPHEN 1 TABLET: 7.5; 325 TABLET ORAL at 08:18

## 2024-08-05 RX ADMIN — DULOXETINE HYDROCHLORIDE 60 MG: 30 CAPSULE, DELAYED RELEASE ORAL at 08:18

## 2024-08-05 RX ADMIN — SACUBITRIL AND VALSARTAN 1 TABLET: 24; 26 TABLET, FILM COATED ORAL at 08:32

## 2024-08-05 RX ADMIN — SODIUM CHLORIDE, PRESERVATIVE FREE 10 ML: 5 INJECTION INTRAVENOUS at 21:46

## 2024-08-05 RX ADMIN — HYDROXYCHLOROQUINE SULFATE 200 MG: 200 TABLET ORAL at 21:45

## 2024-08-05 RX ADMIN — OXYCODONE AND ACETAMINOPHEN 1 TABLET: 7.5; 325 TABLET ORAL at 21:45

## 2024-08-05 RX ADMIN — OXYCODONE HYDROCHLORIDE AND ACETAMINOPHEN 500 MG: 500 TABLET ORAL at 08:18

## 2024-08-05 RX ADMIN — SPIRONOLACTONE 25 MG: 25 TABLET ORAL at 08:18

## 2024-08-05 RX ADMIN — OXYCODONE AND ACETAMINOPHEN 1 TABLET: 7.5; 325 TABLET ORAL at 01:04

## 2024-08-05 RX ADMIN — SACUBITRIL AND VALSARTAN 1 TABLET: 24; 26 TABLET, FILM COATED ORAL at 21:47

## 2024-08-05 RX ADMIN — HYDROXYCHLOROQUINE SULFATE 200 MG: 200 TABLET ORAL at 08:18

## 2024-08-05 RX ADMIN — ATORVASTATIN CALCIUM 40 MG: 20 TABLET, FILM COATED ORAL at 21:46

## 2024-08-05 RX ADMIN — SODIUM CHLORIDE, PRESERVATIVE FREE 10 ML: 5 INJECTION INTRAVENOUS at 08:22

## 2024-08-05 RX ADMIN — Medication 400 MG: at 08:18

## 2024-08-05 RX ADMIN — TRIAMCINOLONE ACETONIDE: 1 CREAM TOPICAL at 08:21

## 2024-08-05 RX ADMIN — PANTOPRAZOLE SODIUM 40 MG: 40 TABLET, DELAYED RELEASE ORAL at 06:25

## 2024-08-05 RX ADMIN — FUROSEMIDE 20 MG: 20 TABLET ORAL at 08:18

## 2024-08-05 ASSESSMENT — PAIN SCALES - GENERAL
PAINLEVEL_OUTOF10: 5
PAINLEVEL_OUTOF10: 2
PAINLEVEL_OUTOF10: 1
PAINLEVEL_OUTOF10: 8
PAINLEVEL_OUTOF10: 1

## 2024-08-05 ASSESSMENT — PAIN SCALES - WONG BAKER: WONGBAKER_NUMERICALRESPONSE: HURTS A LITTLE BIT

## 2024-08-05 ASSESSMENT — PAIN DESCRIPTION - LOCATION
LOCATION: BACK
LOCATION: CHEST;BACK

## 2024-08-05 ASSESSMENT — PAIN DESCRIPTION - DESCRIPTORS: DESCRIPTORS: ACHING

## 2024-08-05 NOTE — PLAN OF CARE
Problem: Physical Therapy - Adult  Goal: By Discharge: Performs mobility at highest level of function for planned discharge setting.  See evaluation for individualized goals.  Description: FUNCTIONAL STATUS PRIOR TO ADMISSION: Patient was modified independent using a rollator for functional mobility. Two or three falls falls per year reported.     HOME SUPPORT PRIOR TO ADMISSION: The patient lived alone with no local support. Does have a son nearby that she can stay with for a little while.    Physical Therapy Goals  Initiated 8/3/2024  1.  Patient will move from supine to sit and sit to supine, scoot up and down, and roll side to side in bed with independence within 7 day(s).    2.  Patient will perform sit to stand with independence within 7 day(s).  3.  Patient will transfer from bed to chair and chair to bed with independence using the least restrictive device within 7 day(s).  4.  Patient will ambulate with modified independence for 100 feet with the least restrictive device within 7 day(s).       Outcome: Progressing   PHYSICAL THERAPY TREATMENT    Patient: Sabina Heath (79 y.o. female)  Date: 8/5/2024  Diagnosis: JULY (acute kidney injury) ([facility]) [N17.9]  Fall, initial encounter [W19.XXXA]  Compression fracture of T7 vertebra, initial encounter ([facility]) [S22.060A] JULY (acute kidney injury) ([facility])      Precautions:                        ASSESSMENT:  Patient continues to benefit from skilled PT services and is slowly progressing towards goals. Pt reports good pain control at rest. Pt limited by impaired balance, coordination, increased need for assistance with functional mobility tasks. Personal rollator in need of repair (R brake non functioning) Min A x 1 for bed mobility with verbal cues for back precautions, denies radicular symptoms with OOB activity, tho demonstrates increased trunk sway and antalgic gait with support. No buckling. Pt remained in chair at end of session. Pt lives alone and will need

## 2024-08-05 NOTE — PROGRESS NOTES
Hospitalist Progress Note      NAME:  Sabina Heath   :  1945  MRM:  [id number]    Date/Time: 2024  10:11 AM           Assessment / Plan:     79-year-old female with a history of A-fib, diabetes, rheumatoid arthritis, hyperlipidemia, renal failure comes to the hospital after having a fall at home.  Patient was found to have a T7 compression fracture.  Patient is admitted for pain control, PT OT evaluation and IR consult.     #T7 compression fracture  #Fall  Pain control, PT OT  Interventional radiology consult for vertebroplasty, most likely on Monday  Will obtain MRI, bone scan prior to procedure [ currently pending clearance for patient's pacemaker, awaiting fax back ]      # Atrial fibrillation  Patient has a pacemaker and defibrillator.    Last EF 40 to 45% with mild MVR, repeated echo was EF 50-55%  Plan  Continue metoprolol    Holding Xarelto [ will restart after procedure, on heparin gtt at the moment until timing of kypho is confirmed]     #CHF  -Not in exacerbation  Repeated TTE with EF 50-55%  Plan  -Will restart patient's home medications,  -Continue Entresto, spironolactone, metoprolol, Lasix    # Yuly-Yuly disease  -Continue topical triamcinolone     #Diabetes  Patient is taking metformin at home.   Continue sliding scale insulin.     #Rheumatoid arthritis  Patient is on Plaquenil and methotrexate and folic acid.     #Hyperlipidemia  Continue Lipitor 40 mg nightly     # Thrombocytopenia  Monitor platelets closely     # Renal failure, been stable, CKD stage IV.  Unknown baseline  Daily labs  FU OP     #Mood disorder   -Continue home Cymbalta        #BMI (Calculated): 39.16    I have personally reviewed the radiographs, laboratory data in Epic and decisions and statements above are based partially on this personal interpretation.                 Care Plan discussed with: Patient    Discussed:  Care Plan    Prophylaxis: SCD/Heparin gtt    Disposition:

## 2024-08-05 NOTE — PLAN OF CARE
Problem: Chronic Conditions and Co-morbidities  Goal: Patient's chronic conditions and co-morbidity symptoms are monitored and maintained or improved  Outcome: Progressing  Flowsheets (Taken 8/5/2024 0744)  Care Plan - Patient's Chronic Conditions and Co-Morbidity Symptoms are Monitored and Maintained or Improved:   Monitor and assess patient's chronic conditions and comorbid symptoms for stability, deterioration, or improvement   Collaborate with multidisciplinary team to address chronic and comorbid conditions and prevent exacerbation or deterioration   Update acute care plan with appropriate goals if chronic or comorbid symptoms are exacerbated and prevent overall improvement and discharge     Problem: Discharge Planning  Goal: Discharge to home or other facility with appropriate resources  Outcome: Progressing  Flowsheets (Taken 8/5/2024 0744)  Discharge to home or other facility with appropriate resources:   Identify barriers to discharge with patient and caregiver   Arrange for needed discharge resources and transportation as appropriate   Identify discharge learning needs (meds, wound care, etc)   Refer to discharge planning if patient needs post-hospital services based on physician order or complex needs related to functional status, cognitive ability or social support system     Problem: Safety - Adult  Goal: Free from fall injury  Outcome: Progressing     Problem: Pain  Goal: Verbalizes/displays adequate comfort level or baseline comfort level  Outcome: Progressing  Flowsheets (Taken 8/5/2024 0744)  Verbalizes/displays adequate comfort level or baseline comfort level:   Encourage patient to monitor pain and request assistance   Assess pain using appropriate pain scale   Implement non-pharmacological measures as appropriate and evaluate response   Consider cultural and social influences on pain and pain management   Administer analgesics based on type and severity of pain and evaluate response   Notify

## 2024-08-05 NOTE — CARE COORDINATION
4:41 PM  CM received a call from admissions at the [facility]- they can accept and will have a private room. Patient will need to be offered choice of both facilities that she chose to send referrals if the other facility is also able to accept. KATHE    8/5/24   3:24 PM      Case Management Daily Progress Note     Received and reviewed handoff from previous CM.      Reason for Admission:      1. Fall, initial encounter    2. Compression fracture of T7 vertebra, initial encounter ([facility])    3. JULY (acute kidney injury) ([facility])    4. Chronic systolic congestive heart failure ([facility])          RUR:   LOS: 4      Transition of care plan:    1). Specialists consulted: MRI today, Kyphoplasty Wednesday  2). PT/OT recommendations: HH unless no supervision, then recommend SNF  3). Transportation at dc: Family  4). CM following for dc needs    CM met with patient, patients DIL and son in the room. They are unable to assist patient at discharge due to their own health issues. The patient and family are interested in referrals for SNF. They would like referrals placed with [facility] and [facility]- CM placed in [facility].    Patient also let CM know that she had Medicaid up until June when she lost it due to missing the deadline for resubmission. CM sent a message to [employer] via [employer].       08/05/24 1528   Services At/After Discharge   Transition of Care Consult (CM Consult) SNF   Partner SNF No   Reason Why Partner SNF Not Chosen Location   Condition of Participation: Discharge Planning   The Patient and/or Patient Representative was provided with a Choice of Provider? Patient   The Patient and/Or Patient Representative agree with the Discharge Plan? Yes   Freedom of Choice list was provided with basic dialogue that supports the patient's individualized plan of care/goals, treatment preferences, and shares the quality data associated with the providers?  Yes  ([facility] and [facility] )       DALTON Davis        Available via [employer]

## 2024-08-05 NOTE — PLAN OF CARE
Problem: Chronic Conditions and Co-morbidities  Goal: Patient's chronic conditions and co-morbidity symptoms are monitored and maintained or improved  8/4/2024 2309 by Rowena Burden RN  Outcome: [facility] Progressing  8/4/2024 1834 by Laurel Holt LPN  Outcome: Progressing     Problem: Discharge Planning  Goal: Discharge to home or other facility with appropriate resources  8/4/2024 2309 by Rowena Burden RN  Outcome: [facility] Progressing  8/4/2024 1834 by Laurel Holt LPN  Outcome: Progressing     Problem: Safety - Adult  Goal: Free from fall injury  8/4/2024 2309 by Rowena Burden RN  Outcome: [facility] Progressing  8/4/2024 1834 by Laurel Holt LPN  Outcome: Progressing     Problem: Pain  Goal: Verbalizes/displays adequate comfort level or baseline comfort level  8/4/2024 2309 by Rowena Burden RN  Outcome: [facility] Progressing  8/4/2024 1834 by Laurel Holt LPN  Outcome: Progressing  Flowsheets (Taken 8/4/2024 0727)  Verbalizes/displays adequate comfort level or baseline comfort level:   Encourage patient to monitor pain and request assistance   Assess pain using appropriate pain scale   Administer analgesics based on type and severity of pain and evaluate response   Implement non-pharmacological measures as appropriate and evaluate response   Consider cultural and social influences on pain and pain management   Notify Licensed Independent Practitioner if interventions unsuccessful or patient reports new pain     Problem: Skin/Tissue Integrity  Goal: Absence of new skin breakdown  Description: 1.  Monitor for areas of redness and/or skin breakdown  2.  Assess vascular access sites hourly  3.  Every 4-6 hours minimum:  Change oxygen saturation probe site  4.  Every 4-6 hours:  If on nasal continuous positive airway pressure, respiratory therapy assess nares and determine need for appliance change or resting period.  8/4/2024 2309 by Rowena Burden RN  Outcome: [facility]  Progressing  8/4/2024 1834 by Laurel Holt LPN  Outcome: Progressing

## 2024-08-05 NOTE — PLAN OF CARE
light grooming seated with set-up as well as to bathe UB, max assist for LB bathe and dress and educated her as to adaptive strategies. She returned to sit in chair and set-up for lunch.             PLAN :  Patient continues to benefit from skilled intervention to address the above impairments.  Continue treatment per established plan of care to address goals.    Recommend with staff: ACTIVITIES OF DAILY LIVING's, there ex, there act    Recommend next OT session: cont towards goals    Recommendation for discharge: (in order for the patient to meet his/her long term goals): Therapy 2x a week in the home, however, may require supervision, cognitive and/or physical assistance due to the following concerns listed below:    Other factors to consider for discharge: poor safety awareness, high risk for falls, and concern for safely navigating or managing the home environment    IF patient discharges home will need the following DME:        SUBJECTIVE:   Patient stated “thank you.”    OBJECTIVE DATA SUMMARY:   Cognitive/Behavioral Status:  Orientation  Overall Orientation Status: Within Normal Limits  Orientation Level: Oriented X4       Functional Mobility and Transfers for ADLs:  Bed Mobility:  Bed Mobility Training  Rolling: Minimum assistance  Supine to Sit: Minimum assistance;Assist X1     Transfers:   Transfer Training  Sit to Stand: Contact-guard assistance;Stand-by assistance;Assist X1  Stand to Sit: Stand-by assistance;Contact-guard assistance;Assist X1           Balance:     Balance  Sitting: Intact  Standing: Impaired;With support  Standing - Static: Good;Constant support  Standing - Dynamic: Fair;Constant support      ADL Intervention:       Grooming: Stand by assistance   Grooming Skilled Clinical Factors: pt sits on rollater to wash face, brush teeth, comb hair    UE Bathing: Setup       LE Bathing: Maximum assistance               LE Dressing: Dependent/Total       Activity Tolerance:   Fair   Please refer to  the flowsheet for vital signs taken during this treatment.    After treatment:   Patient left in no apparent distress sitting up in chair and Call bell within reach    COMMUNICATION/EDUCATION:   The patient's plan of care was discussed with: occupational therapist    Patient Education  Education Given To: Patient  Education Provided: Role of Therapy;Plan of Care  Education Provided Comments: educated as to adaptive strategies for ADL's  Education Method: Verbal  Barriers to Learning: None  Education Outcome: Verbalized understanding    Thank you for this referral.  CHILO Walters/L  Minutes: 35

## 2024-08-06 ENCOUNTER — APPOINTMENT (OUTPATIENT)
Facility: HOSPITAL | Age: 79
DRG: 516 | End: 2024-08-06
Payer: MEDICARE

## 2024-08-06 LAB
ANION GAP SERPL CALC-SCNC: 5 MMOL/L (ref 5–15)
BUN SERPL-MCNC: 33 MG/DL (ref 6–20)
BUN/CREAT SERPL: 18 (ref 12–20)
CALCIUM SERPL-MCNC: 9.2 MG/DL (ref 8.5–10.1)
CHLORIDE SERPL-SCNC: 103 MMOL/L (ref 97–108)
CO2 SERPL-SCNC: 28 MMOL/L (ref 21–32)
COMMENT:: NORMAL
CREAT SERPL-MCNC: 1.82 MG/DL (ref 0.55–1.02)
ERYTHROCYTE [DISTWIDTH] IN BLOOD BY AUTOMATED COUNT: 13.6 % (ref 11.5–14.5)
GLUCOSE BLD STRIP.AUTO-MCNC: 109 MG/DL (ref 65–117)
GLUCOSE BLD STRIP.AUTO-MCNC: 147 MG/DL (ref 65–117)
GLUCOSE BLD STRIP.AUTO-MCNC: 203 MG/DL (ref 65–117)
GLUCOSE BLD STRIP.AUTO-MCNC: 237 MG/DL (ref 65–117)
GLUCOSE SERPL-MCNC: 165 MG/DL (ref 65–100)
HCT VFR BLD AUTO: 33.3 % (ref 35–47)
HGB BLD-MCNC: 11 G/DL (ref 11.5–16)
MCH RBC QN AUTO: 31.7 PG (ref 26–34)
MCHC RBC AUTO-ENTMCNC: 33 G/DL (ref 30–36.5)
MCV RBC AUTO: 96 FL (ref 80–99)
NRBC # BLD: 0 K/UL (ref 0–0.01)
NRBC BLD-RTO: 0 PER 100 WBC
PLATELET # BLD AUTO: 123 K/UL (ref 150–400)
PMV BLD AUTO: 10.6 FL (ref 8.9–12.9)
POTASSIUM SERPL-SCNC: 4.7 MMOL/L (ref 3.5–5.1)
RBC # BLD AUTO: 3.47 M/UL (ref 3.8–5.2)
SERVICE CMNT-IMP: ABNORMAL
SERVICE CMNT-IMP: NORMAL
SODIUM SERPL-SCNC: 136 MMOL/L (ref 136–145)
SPECIMEN HOLD: NORMAL
UFH PPP CHRO-ACNC: 0.24 IU/ML
UFH PPP CHRO-ACNC: 0.46 IU/ML
UFH PPP CHRO-ACNC: 0.56 IU/ML
WBC # BLD AUTO: 4.6 K/UL (ref 3.6–11)

## 2024-08-06 PROCEDURE — 36415 COLL VENOUS BLD VENIPUNCTURE: CPT

## 2024-08-06 PROCEDURE — 6370000000 HC RX 637 (ALT 250 FOR IP): Performed by: HOSPITALIST

## 2024-08-06 PROCEDURE — 6360000002 HC RX W HCPCS: Performed by: STUDENT IN AN ORGANIZED HEALTH CARE EDUCATION/TRAINING PROGRAM

## 2024-08-06 PROCEDURE — 72146 MRI CHEST SPINE W/O DYE: CPT

## 2024-08-06 PROCEDURE — 2580000003 HC RX 258: Performed by: HOSPITALIST

## 2024-08-06 PROCEDURE — 85520 HEPARIN ASSAY: CPT

## 2024-08-06 PROCEDURE — 97535 SELF CARE MNGMENT TRAINING: CPT

## 2024-08-06 PROCEDURE — 97530 THERAPEUTIC ACTIVITIES: CPT

## 2024-08-06 PROCEDURE — 85027 COMPLETE CBC AUTOMATED: CPT

## 2024-08-06 PROCEDURE — 82962 GLUCOSE BLOOD TEST: CPT

## 2024-08-06 PROCEDURE — 97116 GAIT TRAINING THERAPY: CPT

## 2024-08-06 PROCEDURE — 94761 N-INVAS EAR/PLS OXIMETRY MLT: CPT

## 2024-08-06 PROCEDURE — 6370000000 HC RX 637 (ALT 250 FOR IP): Performed by: STUDENT IN AN ORGANIZED HEALTH CARE EDUCATION/TRAINING PROGRAM

## 2024-08-06 PROCEDURE — 80048 BASIC METABOLIC PNL TOTAL CA: CPT

## 2024-08-06 PROCEDURE — 1100000000 HC RM PRIVATE

## 2024-08-06 RX ADMIN — SODIUM CHLORIDE, PRESERVATIVE FREE 10 ML: 5 INJECTION INTRAVENOUS at 21:40

## 2024-08-06 RX ADMIN — OXYCODONE AND ACETAMINOPHEN 1 TABLET: 7.5; 325 TABLET ORAL at 19:32

## 2024-08-06 RX ADMIN — HEPARIN SODIUM 11 UNITS/KG/HR: 10000 INJECTION, SOLUTION INTRAVENOUS at 18:16

## 2024-08-06 RX ADMIN — FOLIC ACID TAB 400 MCG 400 MCG: 400 TAB at 09:38

## 2024-08-06 RX ADMIN — SPIRONOLACTONE 25 MG: 25 TABLET ORAL at 09:38

## 2024-08-06 RX ADMIN — TRIAMCINOLONE ACETONIDE: 1 CREAM TOPICAL at 21:40

## 2024-08-06 RX ADMIN — PANTOPRAZOLE SODIUM 40 MG: 40 TABLET, DELAYED RELEASE ORAL at 06:04

## 2024-08-06 RX ADMIN — Medication 400 MG: at 09:38

## 2024-08-06 RX ADMIN — HYDROXYCHLOROQUINE SULFATE 200 MG: 200 TABLET ORAL at 21:38

## 2024-08-06 RX ADMIN — OXYCODONE HYDROCHLORIDE AND ACETAMINOPHEN 500 MG: 500 TABLET ORAL at 09:38

## 2024-08-06 RX ADMIN — HYDROXYCHLOROQUINE SULFATE 200 MG: 200 TABLET ORAL at 09:40

## 2024-08-06 RX ADMIN — ATORVASTATIN CALCIUM 40 MG: 20 TABLET, FILM COATED ORAL at 21:39

## 2024-08-06 RX ADMIN — DULOXETINE HYDROCHLORIDE 60 MG: 30 CAPSULE, DELAYED RELEASE ORAL at 09:37

## 2024-08-06 RX ADMIN — SACUBITRIL AND VALSARTAN 1 TABLET: 24; 26 TABLET, FILM COATED ORAL at 09:38

## 2024-08-06 RX ADMIN — INSULIN LISPRO 1 UNITS: 100 INJECTION, SOLUTION INTRAVENOUS; SUBCUTANEOUS at 12:14

## 2024-08-06 RX ADMIN — SODIUM CHLORIDE, PRESERVATIVE FREE 10 ML: 5 INJECTION INTRAVENOUS at 09:39

## 2024-08-06 RX ADMIN — FUROSEMIDE 20 MG: 20 TABLET ORAL at 09:39

## 2024-08-06 RX ADMIN — SACUBITRIL AND VALSARTAN 1 TABLET: 24; 26 TABLET, FILM COATED ORAL at 21:39

## 2024-08-06 RX ADMIN — OXYCODONE AND ACETAMINOPHEN 1 TABLET: 7.5; 325 TABLET ORAL at 09:37

## 2024-08-06 RX ADMIN — TRIAMCINOLONE ACETONIDE: 1 CREAM TOPICAL at 09:39

## 2024-08-06 RX ADMIN — HEPARIN SODIUM 2000 UNITS: 1000 INJECTION INTRAVENOUS; SUBCUTANEOUS at 06:01

## 2024-08-06 ASSESSMENT — PAIN SCALES - GENERAL
PAINLEVEL_OUTOF10: 8
PAINLEVEL_OUTOF10: 9
PAINLEVEL_OUTOF10: 5
PAINLEVEL_OUTOF10: 5
PAINLEVEL_OUTOF10: 4

## 2024-08-06 ASSESSMENT — PAIN DESCRIPTION - DESCRIPTORS
DESCRIPTORS: ACHING
DESCRIPTORS: DISCOMFORT

## 2024-08-06 ASSESSMENT — PAIN DESCRIPTION - ORIENTATION: ORIENTATION: LOWER

## 2024-08-06 ASSESSMENT — PAIN DESCRIPTION - LOCATION
LOCATION: BACK
LOCATION: BACK

## 2024-08-06 NOTE — PLAN OF CARE
Problem: Occupational Therapy - Adult  Goal: By Discharge: Performs self-care activities at highest level of function for planned discharge setting.  See evaluation for individualized goals.  Description: FUNCTIONAL STATUS PRIOR TO ADMISSION:  Per pt report, pt is MI for self care and functional transfers/mobility with rollator.     HOME SUPPORT: Patient lived alone with no local support. Pt did state her son and daughter in law live close by and may be able to go stay with them for a week or two.     Occupational Therapy Goals:  Initiated 8/3/2024  1.  Patient will perform grooming with Modified [place] within 7 day(s).  2.  Patient will perform upper body dressing with Modified [place] within 7 day(s).  3.  Patient will perform lower body dressing with Modified [place] with dressing aids within 7 day(s).  4.  Patient will perform toilet transfers with Modified [place]  within 7 day(s).  5.  Patient will perform all aspects of toileting with Modified [place] within 7 day(s).  6.  Patient will participate in upper extremity therapeutic exercise/activities with Modified [place] for 10 minutes within 7 day(s).    7.  Patient will verbalize/demonstrate 3/3 back precautions during ADL tasks without cues within 7 days.      Outcome: Progressing   OCCUPATIONAL THERAPY TREATMENT  Patient: Sabina Heath (79 y.o. female)  Date: 8/6/2024  Primary Diagnosis: JULY (acute kidney injury) ([facility]) [N17.9]  Fall, initial encounter [W19.XXXA]  Compression fracture of T7 vertebra, initial encounter ([facility]) [S22.060A]       Precautions:                  Chart, occupational therapy assessment, plan of care, and goals were reviewed.    ASSESSMENT  Patient continues to benefit from skilled OT services and is progressing towards goals. Pt ambulated to restroom, transfers to [facility] over commode contact guard. Educated pt as to toileting tongs, adaptive methods for hygiene. She stands at sink to wash her hands and

## 2024-08-06 NOTE — PLAN OF CARE
Problem: Safety - Adult  Goal: Free from fall injury  Outcome: Progressing  Flowsheets (Taken 8/6/2024 1138)  Free From Fall Injury: Instruct family/caregiver on patient safety

## 2024-08-06 NOTE — PLAN OF CARE
Problem: Physical Therapy - Adult  Goal: By Discharge: Performs mobility at highest level of function for planned discharge setting.  See evaluation for individualized goals.  Description: FUNCTIONAL STATUS PRIOR TO ADMISSION: Patient was modified independent using a rollator for functional mobility. Two or three falls falls per year reported.     HOME SUPPORT PRIOR TO ADMISSION: The patient lived alone with no local support. Does have a son nearby that she can stay with for a little while.    Physical Therapy Goals  Initiated 8/3/2024  1.  Patient will move from supine to sit and sit to supine, scoot up and down, and roll side to side in bed with independence within 7 day(s).    2.  Patient will perform sit to stand with independence within 7 day(s).  3.  Patient will transfer from bed to chair and chair to bed with independence using the least restrictive device within 7 day(s).  4.  Patient will ambulate with modified independence for 100 feet with the least restrictive device within 7 day(s).       Outcome: Progressing   PHYSICAL THERAPY TREATMENT    Patient: Sabina Heath (79 y.o. female)  Date: 8/6/2024  Diagnosis: JULY (acute kidney injury) ([facility]) [N17.9]  Fall, initial encounter [W19.XXXA]  Compression fracture of T7 vertebra, initial encounter ([facility]) [S22.060A] JULY (acute kidney injury) ([facility])      Precautions:                        ASSESSMENT:  Patient continues to benefit from skilled PT services and is progressing towards goals. Pt with increased back pain today with limited activity. Overall Min A for bed mobility an CGA for OOB transfers and short gait training, reports LB fatigue with L shoulder pain with dynamic standing activity (standing at sink) declining additional gait training and requesting to return to chair. Hopeful for MRI today and kyphoplasty 8/7/24. Pt will need assistance from family at discharge.         PLAN:  Patient continues to benefit from skilled intervention to address the

## 2024-08-06 NOTE — CARE COORDINATION
08/06/24     Update-  Patient has chosen [facility] for rehab. Facility notified and Insurance auth started on 8/6.      Case management daily progress note    Received and reviewed handoff from previous CM.     Reason for Admission:     JULY (acute kidney injury) ([facility]) [N17.9]  Fall, initial encounter [W19.XXXA]  Compression fracture of T7 vertebra, initial encounter ([facility]) [S22.060A]       RUR: 16%    Transition of care plan:  Medical management- MRI planned for today. IR consulted for Kyphoplasty, possibly Wednesday.   PT/OT recommends HH unless no supervision, then recommend SNF. Patient accepted to both [facility] and [facility] via [employer]. Confirmed both will likely have private room available later this week. Patient will require insurance auth once facility is chosen.  Per notes, patient had Medicaid until June then lost due to missing resubmission deadline. First source contacted.    Alice Matthew RN, [facility]  [facility]     Available via [employer]aclip

## 2024-08-06 NOTE — PROGRESS NOTES
Patient pending kyphoplasty and planned for 8/6 however there are mixed notes due to patient MRI delayed d/t Pacemaker clearance... patient has NPO orders for 8/6 at 0015 but CM note states that kyphoplasty will be on Wed 8/7 and Hospitalist note states awaiting confirmation of surgery ... So at this time after consulting with on call NP the patient is NPO and heparin drip that was stopped has been restarted until DR assesses in AM and confirms the plan for surgery ; patient has been informed     0600  Heparin rate change to 11u/kg and bolus given

## 2024-08-06 NOTE — PROGRESS NOTES
Hospitalist Progress Note      NAME:  Sabina Heath   :  1945  MRM:  [id number]    Date/Time: 2024  9:18 AM           Assessment / Plan:     79-year-old female with a history of A-fib, diabetes, rheumatoid arthritis, hyperlipidemia, renal failure comes to the hospital after having a fall at home.  Patient was found to have a T7 compression fracture.  Patient is admitted for pain control, PT OT evaluation and IR consult.     #T7 compression fracture  #Fall  Pain control, PT OT  Interventional radiology consult for vertebroplasty  Will obtain MRI, bone scan, pt planned for MRI today at 1 pm, kypho was scheduled for today, but cancelled as MRI was not done, and will reschedule for tomorrow      # Atrial fibrillation  Patient has a pacemaker and defibrillator.    Last EF 40 to 45% with mild MVR, repeated echo was EF 50-55%  Plan  Continue metoprolol    Holding Xarelto [ will restart after procedure, on heparin gtt at the moment, can d/c for kypho tomorrow 6h prior to procedure ]     #CHF  -Not in exacerbation  Repeated TTE with EF 50-55%  Plan  -Continue Entresto, spironolactone, metoprolol, Lasix    # Yuly-Yuly disease  -Continue topical triamcinolone     #Diabetes  Patient is taking metformin at home.   Continue sliding scale insulin.     #Rheumatoid arthritis  Patient is on Plaquenil and methotrexate and folic acid.     #Hyperlipidemia  Continue Lipitor 40 mg nightly     # Thrombocytopenia  Monitor platelets closely     # Renal failure, been stable, CKD stage IV.  Unknown baseline  Daily labs  FU OP     #Mood disorder   -Continue home Cymbalta        #BMI (Calculated): 39.16    I have personally reviewed the radiographs, laboratory data in Epic and decisions and statements above are based partially on this personal interpretation.                 Care Plan discussed with: Patient    Discussed:  Care Plan    Prophylaxis: SCD/Heparin gtt    Disposition:   Medications   Medication Dose Route Frequency    heparin (porcine) injection 4,000 Units  4,000 Units IntraVENous PRN    heparin (porcine) injection 2,000 Units  2,000 Units IntraVENous PRN    heparin 25,000 units in dextrose 5% 250 mL (premix) infusion  5-30 Units/kg/hr IntraVENous Continuous    lidocaine 4 % external patch 1 patch  1 patch TransDERmal Daily    polyethylene glycol (GLYCOLAX) packet 17 g  17 g Oral Daily    triamcinolone (KENALOG) 0.1 % cream   Topical BID    hydroxychloroquine (PLAQUENIL) tablet 200 mg  200 mg Oral BID    sacubitril-valsartan (ENTRESTO) 24-26 MG per tablet 1 tablet  1 tablet Oral BID    spironolactone (ALDACTONE) tablet 25 mg  25 mg Oral Daily    sodium chloride flush 0.9 % injection 5-40 mL  5-40 mL IntraVENous 2 times per day    sodium chloride flush 0.9 % injection 5-40 mL  5-40 mL IntraVENous PRN    0.9 % sodium chloride infusion   IntraVENous PRN    ondansetron (ZOFRAN-ODT) disintegrating tablet 4 mg  4 mg Oral Q8H PRN    Or    ondansetron (ZOFRAN) injection 4 mg  4 mg IntraVENous Q6H PRN    polyethylene glycol (GLYCOLAX) packet 17 g  17 g Oral Daily PRN    acetaminophen (TYLENOL) tablet 650 mg  650 mg Oral Q6H PRN    Or    acetaminophen (TYLENOL) suppository 650 mg  650 mg Rectal Q6H PRN    ascorbic acid (VITAMIN C) tablet 500 mg  500 mg Oral Daily    albuterol (PROVENTIL) (2.5 MG/3ML) 0.083% nebulizer solution 2.5 mg  2.5 mg Nebulization Q4H PRN    atorvastatin (LIPITOR) tablet 40 mg  40 mg Oral QHS    DULoxetine (CYMBALTA) extended release capsule 60 mg  60 mg Oral Daily    folic acid (FOLVITE) tablet 400 mcg  400 mcg Oral Daily    furosemide (LASIX) tablet 20 mg  20 mg Oral Daily    oxyCODONE-acetaminophen (PERCOCET) 7.5-325 MG per tablet 1 tablet  1 tablet Oral Q6H PRN    pantoprazole (PROTONIX) tablet 40 mg  40 mg Oral QAM AC    sennosides-docusate sodium (SENOKOT-S) 8.6-50 MG tablet 1 tablet  1 tablet Oral Daily PRN    dextrose 10 % infusion   IntraVENous Continuous PRN

## 2024-08-07 ENCOUNTER — APPOINTMENT (OUTPATIENT)
Facility: HOSPITAL | Age: 79
DRG: 516 | End: 2024-08-07
Attending: STUDENT IN AN ORGANIZED HEALTH CARE EDUCATION/TRAINING PROGRAM
Payer: MEDICARE

## 2024-08-07 LAB
COMMENT:: NORMAL
ECHO BSA: 2.11 M2
ERYTHROCYTE [DISTWIDTH] IN BLOOD BY AUTOMATED COUNT: 13.9 % (ref 11.5–14.5)
GLUCOSE BLD STRIP.AUTO-MCNC: 126 MG/DL (ref 65–117)
GLUCOSE BLD STRIP.AUTO-MCNC: 148 MG/DL (ref 65–117)
GLUCOSE BLD STRIP.AUTO-MCNC: 154 MG/DL (ref 65–117)
GLUCOSE BLD STRIP.AUTO-MCNC: 99 MG/DL (ref 65–117)
HCT VFR BLD AUTO: 32.8 % (ref 35–47)
HGB BLD-MCNC: 10.8 G/DL (ref 11.5–16)
MCH RBC QN AUTO: 31.5 PG (ref 26–34)
MCHC RBC AUTO-ENTMCNC: 32.9 G/DL (ref 30–36.5)
MCV RBC AUTO: 95.6 FL (ref 80–99)
NRBC # BLD: 0 K/UL (ref 0–0.01)
NRBC BLD-RTO: 0 PER 100 WBC
PLATELET # BLD AUTO: 114 K/UL (ref 150–400)
PMV BLD AUTO: 10.1 FL (ref 8.9–12.9)
RBC # BLD AUTO: 3.43 M/UL (ref 3.8–5.2)
SERVICE CMNT-IMP: ABNORMAL
SERVICE CMNT-IMP: NORMAL
SPECIMEN HOLD: NORMAL
UFH PPP CHRO-ACNC: <0.1 IU/ML
WBC # BLD AUTO: 4.7 K/UL (ref 3.6–11)

## 2024-08-07 PROCEDURE — 6370000000 HC RX 637 (ALT 250 FOR IP): Performed by: HOSPITALIST

## 2024-08-07 PROCEDURE — 22513 PERQ VERTEBRAL AUGMENTATION: CPT

## 2024-08-07 PROCEDURE — 99152 MOD SED SAME PHYS/QHP 5/>YRS: CPT | Performed by: STUDENT IN AN ORGANIZED HEALTH CARE EDUCATION/TRAINING PROGRAM

## 2024-08-07 PROCEDURE — 99153 MOD SED SAME PHYS/QHP EA: CPT | Performed by: STUDENT IN AN ORGANIZED HEALTH CARE EDUCATION/TRAINING PROGRAM

## 2024-08-07 PROCEDURE — 36415 COLL VENOUS BLD VENIPUNCTURE: CPT

## 2024-08-07 PROCEDURE — 2500000003 HC RX 250 WO HCPCS: Performed by: STUDENT IN AN ORGANIZED HEALTH CARE EDUCATION/TRAINING PROGRAM

## 2024-08-07 PROCEDURE — 1100000000 HC RM PRIVATE

## 2024-08-07 PROCEDURE — 85520 HEPARIN ASSAY: CPT

## 2024-08-07 PROCEDURE — 77002 NEEDLE LOCALIZATION BY XRAY: CPT

## 2024-08-07 PROCEDURE — 6370000000 HC RX 637 (ALT 250 FOR IP): Performed by: STUDENT IN AN ORGANIZED HEALTH CARE EDUCATION/TRAINING PROGRAM

## 2024-08-07 PROCEDURE — C1713 ANCHOR/SCREW BN/BN,TIS/BN: HCPCS | Performed by: STUDENT IN AN ORGANIZED HEALTH CARE EDUCATION/TRAINING PROGRAM

## 2024-08-07 PROCEDURE — 0PU43JZ SUPPLEMENT THORACIC VERTEBRA WITH SYNTHETIC SUBSTITUTE, PERCUTANEOUS APPROACH: ICD-10-PCS | Performed by: STUDENT IN AN ORGANIZED HEALTH CARE EDUCATION/TRAINING PROGRAM

## 2024-08-07 PROCEDURE — 0PS43ZZ REPOSITION THORACIC VERTEBRA, PERCUTANEOUS APPROACH: ICD-10-PCS | Performed by: STUDENT IN AN ORGANIZED HEALTH CARE EDUCATION/TRAINING PROGRAM

## 2024-08-07 PROCEDURE — 97535 SELF CARE MNGMENT TRAINING: CPT

## 2024-08-07 PROCEDURE — 6360000002 HC RX W HCPCS: Performed by: STUDENT IN AN ORGANIZED HEALTH CARE EDUCATION/TRAINING PROGRAM

## 2024-08-07 PROCEDURE — 2709999900 HC NON-CHARGEABLE SUPPLY: Performed by: STUDENT IN AN ORGANIZED HEALTH CARE EDUCATION/TRAINING PROGRAM

## 2024-08-07 PROCEDURE — 2580000003 HC RX 258: Performed by: HOSPITALIST

## 2024-08-07 PROCEDURE — 85027 COMPLETE CBC AUTOMATED: CPT

## 2024-08-07 PROCEDURE — 2580000003 HC RX 258: Performed by: STUDENT IN AN ORGANIZED HEALTH CARE EDUCATION/TRAINING PROGRAM

## 2024-08-07 PROCEDURE — 97116 GAIT TRAINING THERAPY: CPT

## 2024-08-07 PROCEDURE — 82962 GLUCOSE BLOOD TEST: CPT

## 2024-08-07 RX ORDER — BUPIVACAINE HYDROCHLORIDE 2.5 MG/ML
INJECTION, SOLUTION EPIDURAL; INFILTRATION; INTRACAUDAL PRN
Status: COMPLETED | OUTPATIENT
Start: 2024-08-07 | End: 2024-08-07

## 2024-08-07 RX ORDER — KETOROLAC TROMETHAMINE 15 MG/ML
INJECTION, SOLUTION INTRAMUSCULAR; INTRAVENOUS PRN
Status: COMPLETED | OUTPATIENT
Start: 2024-08-07 | End: 2024-08-07

## 2024-08-07 RX ORDER — MIDAZOLAM HYDROCHLORIDE 1 MG/ML
INJECTION INTRAMUSCULAR; INTRAVENOUS PRN
Status: COMPLETED | OUTPATIENT
Start: 2024-08-07 | End: 2024-08-07

## 2024-08-07 RX ORDER — LIDOCAINE HYDROCHLORIDE 10 MG/ML
INJECTION, SOLUTION EPIDURAL; INFILTRATION; INTRACAUDAL; PERINEURAL PRN
Status: COMPLETED | OUTPATIENT
Start: 2024-08-07 | End: 2024-08-07

## 2024-08-07 RX ORDER — FENTANYL CITRATE 50 UG/ML
INJECTION, SOLUTION INTRAMUSCULAR; INTRAVENOUS PRN
Status: COMPLETED | OUTPATIENT
Start: 2024-08-07 | End: 2024-08-07

## 2024-08-07 RX ORDER — DIPHENHYDRAMINE HYDROCHLORIDE 50 MG/ML
INJECTION INTRAMUSCULAR; INTRAVENOUS PRN
Status: COMPLETED | OUTPATIENT
Start: 2024-08-07 | End: 2024-08-07

## 2024-08-07 RX ADMIN — RIVAROXABAN 15 MG: 15 TABLET, FILM COATED ORAL at 17:37

## 2024-08-07 RX ADMIN — SACUBITRIL AND VALSARTAN 1 TABLET: 24; 26 TABLET, FILM COATED ORAL at 20:54

## 2024-08-07 RX ADMIN — BUPIVACAINE HYDROCHLORIDE 10 ML: 2.5 INJECTION, SOLUTION EPIDURAL; INFILTRATION; INTRACAUDAL; PERINEURAL at 11:34

## 2024-08-07 RX ADMIN — KETOROLAC TROMETHAMINE 15 MG: 15 INJECTION, SOLUTION INTRAMUSCULAR; INTRAVENOUS at 11:17

## 2024-08-07 RX ADMIN — LIDOCAINE HYDROCHLORIDE 8 ML: 10 INJECTION, SOLUTION EPIDURAL; INFILTRATION; INTRACAUDAL; PERINEURAL at 11:33

## 2024-08-07 RX ADMIN — SODIUM CHLORIDE, PRESERVATIVE FREE 10 ML: 5 INJECTION INTRAVENOUS at 20:54

## 2024-08-07 RX ADMIN — FOLIC ACID TAB 400 MCG 400 MCG: 400 TAB at 08:46

## 2024-08-07 RX ADMIN — FUROSEMIDE 20 MG: 20 TABLET ORAL at 08:46

## 2024-08-07 RX ADMIN — DIPHENHYDRAMINE HYDROCHLORIDE 25 MG: 50 INJECTION, SOLUTION INTRAMUSCULAR; INTRAVENOUS at 11:19

## 2024-08-07 RX ADMIN — DULOXETINE HYDROCHLORIDE 60 MG: 30 CAPSULE, DELAYED RELEASE ORAL at 08:45

## 2024-08-07 RX ADMIN — Medication 400 MG: at 08:46

## 2024-08-07 RX ADMIN — FENTANYL CITRATE 50 MCG: 50 INJECTION, SOLUTION INTRAMUSCULAR; INTRAVENOUS at 11:27

## 2024-08-07 RX ADMIN — METOPROLOL SUCCINATE 50 MG: 50 TABLET, EXTENDED RELEASE ORAL at 08:46

## 2024-08-07 RX ADMIN — TRIAMCINOLONE ACETONIDE: 1 CREAM TOPICAL at 20:54

## 2024-08-07 RX ADMIN — HYDROXYCHLOROQUINE SULFATE 200 MG: 200 TABLET ORAL at 20:54

## 2024-08-07 RX ADMIN — VANCOMYCIN HYDROCHLORIDE 1000 MG: 1 INJECTION, POWDER, LYOPHILIZED, FOR SOLUTION INTRAVENOUS at 11:16

## 2024-08-07 RX ADMIN — OXYCODONE AND ACETAMINOPHEN 1 TABLET: 7.5; 325 TABLET ORAL at 17:41

## 2024-08-07 RX ADMIN — ATORVASTATIN CALCIUM 40 MG: 20 TABLET, FILM COATED ORAL at 20:54

## 2024-08-07 RX ADMIN — BUPIVACAINE HYDROCHLORIDE 8 ML: 2.5 INJECTION, SOLUTION EPIDURAL; INFILTRATION; INTRACAUDAL; PERINEURAL at 11:38

## 2024-08-07 RX ADMIN — MIDAZOLAM 2 MG: 1 INJECTION INTRAMUSCULAR; INTRAVENOUS at 11:35

## 2024-08-07 RX ADMIN — MIDAZOLAM 1 MG: 1 INJECTION INTRAMUSCULAR; INTRAVENOUS at 11:38

## 2024-08-07 RX ADMIN — HYDROXYCHLOROQUINE SULFATE 200 MG: 200 TABLET ORAL at 08:46

## 2024-08-07 RX ADMIN — FENTANYL CITRATE 25 MCG: 50 INJECTION, SOLUTION INTRAMUSCULAR; INTRAVENOUS at 11:35

## 2024-08-07 RX ADMIN — OXYCODONE AND ACETAMINOPHEN 1 TABLET: 7.5; 325 TABLET ORAL at 08:45

## 2024-08-07 RX ADMIN — OXYCODONE HYDROCHLORIDE AND ACETAMINOPHEN 500 MG: 500 TABLET ORAL at 08:46

## 2024-08-07 RX ADMIN — TRIAMCINOLONE ACETONIDE: 1 CREAM TOPICAL at 08:47

## 2024-08-07 RX ADMIN — FENTANYL CITRATE 25 MCG: 50 INJECTION, SOLUTION INTRAMUSCULAR; INTRAVENOUS at 11:20

## 2024-08-07 RX ADMIN — MIDAZOLAM 1 MG: 1 INJECTION INTRAMUSCULAR; INTRAVENOUS at 11:29

## 2024-08-07 RX ADMIN — FENTANYL CITRATE 25 MCG: 50 INJECTION, SOLUTION INTRAMUSCULAR; INTRAVENOUS at 11:38

## 2024-08-07 RX ADMIN — DIPHENHYDRAMINE HYDROCHLORIDE 25 MG: 50 INJECTION, SOLUTION INTRAMUSCULAR; INTRAVENOUS at 11:23

## 2024-08-07 ASSESSMENT — PAIN DESCRIPTION - DESCRIPTORS
DESCRIPTORS: ACHING;DISCOMFORT;SORE
DESCRIPTORS: ACHING;DISCOMFORT;SORE

## 2024-08-07 ASSESSMENT — PAIN SCALES - GENERAL
PAINLEVEL_OUTOF10: 9
PAINLEVEL_OUTOF10: 6
PAINLEVEL_OUTOF10: 5
PAINLEVEL_OUTOF10: 0

## 2024-08-07 ASSESSMENT — PAIN DESCRIPTION - LOCATION
LOCATION: BACK;SHOULDER
LOCATION: BACK

## 2024-08-07 ASSESSMENT — PAIN DESCRIPTION - ORIENTATION: ORIENTATION: RIGHT

## 2024-08-07 NOTE — PROGRESS NOTES
TRANSFER - OUT REPORT:    Verbal report given to JAZ AUGUSTINE(name) on [facility]       Report consisted of patient's Situation, Background, Assessment and   Recommendations(SBAR).     Information from the following report(s) Nurse Handoff Report was reviewed with the receiving nurse.    Opportunity for questions and clarification was provided.      Patient transported with:   Registered Nurse

## 2024-08-07 NOTE — CARE COORDINATION
08/07/24  11:33 AM    Case management daily progress note       Reason for Admission:     JULY (acute kidney injury) ([facility]) [N17.9]  Fall, initial encounter [W19.XXXA]  Compression fracture of T7 vertebra, initial encounter ([facility]) [S22.060A]       RUR: 14%    Transition of care plan:  Medical management-  IR consulted for Kyphoplasty planned for today.   PT/OT recommends HH unless no supervision, then recommend SNF. Patient accepted to [facility] via [employer]. Patient states this is her preference. Patient will require insurance auth, started 8/6.  Per notes, patient had Medicaid until June then lost due to missing resubmission deadline. First source contacted.    Alice Matthew RN, [facility]  [facility]    Available via [employer]

## 2024-08-07 NOTE — PLAN OF CARE
Problem: Occupational Therapy - Adult  Goal: By Discharge: Performs self-care activities at highest level of function for planned discharge setting.  See evaluation for individualized goals.  Description: FUNCTIONAL STATUS PRIOR TO ADMISSION:  Per pt report, pt is MI for self care and functional transfers/mobility with rollator.     HOME SUPPORT: Patient lived alone with no local support. Pt did state her son and daughter in law live close by and may be able to go stay with them for a week or two.     Occupational Therapy Goals:  Initiated 8/3/2024  1.  Patient will perform grooming with Modified [place] within 7 day(s).  2.  Patient will perform upper body dressing with Modified [place] within 7 day(s).  3.  Patient will perform lower body dressing with Modified [place] with dressing aids within 7 day(s).  4.  Patient will perform toilet transfers with Modified [place]  within 7 day(s).  5.  Patient will perform all aspects of toileting with Modified [place] within 7 day(s).  6.  Patient will participate in upper extremity therapeutic exercise/activities with Modified [place] for 10 minutes within 7 day(s).    7.  Patient will verbalize/demonstrate 3/3 back precautions during ADL tasks without cues within 7 days.      Outcome: Progressing   OCCUPATIONAL THERAPY TREATMENT  Patient: Sabina Heath (79 y.o. female)  Date: 8/7/2024  Primary Diagnosis: JULY (acute kidney injury) ([facility]) [N17.9]  Fall, initial encounter [W19.XXXA]  Compression fracture of T7 vertebra, initial encounter ([facility]) [S22.060A]       Precautions:                  Chart, occupational therapy assessment, plan of care, and goals were reviewed.    ASSESSMENT  Patient continues to benefit from skilled OT services and is progressing towards goals. Pt sat up edge of bed and ambulated to restroom to wash her face and hands stand by assist. She verbalized having pain meds not long ago for her shoulder. Pt left seated in

## 2024-08-07 NOTE — PROCEDURES
Interventional Radiology  Procedure Note        8/7/2024 11:46 AM    Patient: Sabina Heath     Informed consent obtained    Diagnosis: T7 compression fracture    Procedure(s): T7 kyphoplasty    Estimated blood loss: less than 5cc    Anesthesia: Moderate sedation    Specimens removed:  none    Complications: None    Implants: None    Primary Physician: Darek Diaz MD    Recommendations: N/A    Full dictated report to follow    Darek Diaz MD  Interventional Radiology  [facility]  11:46 AM, 8/7/2024

## 2024-08-07 NOTE — PLAN OF CARE
Problem: Physical Therapy - Adult  Goal: By Discharge: Performs mobility at highest level of function for planned discharge setting.  See evaluation for individualized goals.  Description: FUNCTIONAL STATUS PRIOR TO ADMISSION: Patient was modified independent using a rollator for functional mobility. Two or three falls falls per year reported.     HOME SUPPORT PRIOR TO ADMISSION: The patient lived alone with no local support. Does have a son nearby that she can stay with for a little while.    Physical Therapy Goals  Initiated 8/3/2024  1.  Patient will move from supine to sit and sit to supine, scoot up and down, and roll side to side in bed with independence within 7 day(s).    2.  Patient will perform sit to stand with independence within 7 day(s).  3.  Patient will transfer from bed to chair and chair to bed with independence using the least restrictive device within 7 day(s).  4.  Patient will ambulate with modified independence for 100 feet with the least restrictive device within 7 day(s).       Outcome: Progressing   PHYSICAL THERAPY TREATMENT    Patient: Sabina Heath (79 y.o. female)  Date: 8/7/2024  Diagnosis: JULY (acute kidney injury) ([facility]) [N17.9]  Fall, initial encounter [W19.XXXA]  Compression fracture of T7 vertebra, initial encounter ([facility]) [S22.060A] JULY (acute kidney injury) ([facility])      Precautions:                        ASSESSMENT:  Patient continues to benefit from skilled PT services and is progressing towards goals. Patient underwent kyphoplasty this AM and with improved activity tolerance and decreased pain.  Patient able to increase ambulation distance to 100 feet with rollator, verbal cuing for improved upright posture due to increased anterior forward flexed posture.          PLAN:  Patient continues to benefit from skilled intervention to address the above impairments.  Continue treatment per established plan of care.    Recommend with staff: therapy recommendations for staff:  Recommend mobility with staff assist x1 using gait belt and rolling walker.      Recommendation for discharge: (in order for the patient to meet his/her long term goals): Therapy up to 5 days/week in Skilled nursing facility patient request    Other factors to consider for discharge: lives alone    IF patient discharges home will need the following DME: patient owns DME required for discharge       SUBJECTIVE:   Patient stated, \"I would like a cheeseburger and some of that potato salad.\"    OBJECTIVE DATA SUMMARY:   Critical Behavior:  Orientation  Overall Orientation Status: Within Normal Limits  Orientation Level: Oriented X4       Functional Mobility Training:  Bed Mobility:  Bed Mobility Training  Rolling: Contact-guard assistance  Supine to Sit: Contact-guard assistance  Transfers:  Transfer Training  Sit to Stand: Stand-by assistance  Stand to Sit: Stand-by assistance  Bed to Chair: Stand-by assistance  Balance:      Ambulation/Gait Training:     Gait  Overall Level of Assistance: Stand-by assistance  Distance (ft): 100 Feet  Assistive Device: Walker, rollator;Gait belt  Speed/Erl[place]: Pace decreased (< 100 feet/min)  Gait Abnormalities:  (forward flexed posture)        Neuro Re-Education:                    Pain Rating:  None reported    Activity Tolerance:   Good and Fair     After treatment:   Patient left in no apparent distress sitting up in chair and Call bell within reach      COMMUNICATION/EDUCATION:   The patient's plan of care was discussed with: registered nurse       Nicolette Jacobs PT,DPT,NCS,CLT  Minutes: 13

## 2024-08-07 NOTE — PROGRESS NOTES
1152: TRANSFER - IN REPORT:    Verbal report received from Kerri Reyna on Sabina Heath  being received from [facility]io for routine post-op      Report consisted of patient's Situation, Background, Assessment and   Recommendations(SBAR).     Information from the following report(s) Nurse Handoff Report was reviewed with the receiving nurse.    Opportunity for questions and clarification was provided.      Assessment completed upon patient's arrival to unit and care assumed.     1152: Patient received from [facility] lab. Patient with gauze & transparent to left upper back. Site clean, dry and intact. No hematoma. VS stable. Patient with no complaints at this time.     1240: TRANSFER - OUT REPORT:    Verbal report given to Bryan SEBASTIAN on Sabina Heath  being transferred to 414 for routine progression of patient care       Report consisted of patient's Situation, Background, Assessment and   Recommendations(SBAR).     Information from the following report(s) Nurse Handoff Report was reviewed with the receiving nurse.           Lines:   Peripheral IV 08/01/24 Left Antecubital (Active)   Site Assessment Clean, dry & intact 08/07/24 1202   Line Status Infusing 08/07/24 1202   Line Care Connections checked and tightened 08/07/24 1202   Phlebitis Assessment No symptoms 08/07/24 1202   Infiltration Assessment 0 08/07/24 1202   Alcohol Cap Used Yes 08/07/24 1202   Dressing Status Clean, dry & intact 08/07/24 1202   Dressing Type Transparent 08/07/24 1202        Opportunity for questions and clarification was provided.      Patient transported with:  Monitor and Registered Nurse

## 2024-08-07 NOTE — PROGRESS NOTES
Hospitalist Progress Note      NAME:  Sabina Heath   :  1945  MRM:  [id number]    Date/Time: 2024  10:40 AM           Assessment / Plan:     79-year-old female with a history of A-fib, diabetes, rheumatoid arthritis, hyperlipidemia, renal failure comes to the hospital after having a fall at home.  Patient was found to have a T7 compression fracture.  Patient is admitted for pain control, PT OT evaluation and IR consult.     #T7 compression fracture  #Fall  Pain control, PT OT  Interventional radiology consult for vertebroplasty   MRI  Multilevel degenerative plate osteophytes, facet arthropathy, epidurallipomatosis, and small disc herniations. Multilevel mild spinal canal stenosis,  most severe at T3-T4. Multilevel bilateral foraminal stenosis, most severe at  T1-T2, T2-T3, T3-T4, T7-T8, T8-T9, and T9-T10.    T7 acute compression fracture.  Plan  Kyphoplasty today by IR. Addendum: pt tolerated well        Multilevel degenerative plate osteophytes  As reported on MRI  Plan  Follow-up outpatient discussed with the patient    # Atrial fibrillation  Patient has a pacemaker and defibrillator.    Last EF 40 to 45% with mild MVR, repeated echo was EF 50-55%  Plan  Continue metoprolol    Restart Xarelto     #CHF  -Not in exacerbation  Repeated TTE with EF 50-55%  Plan  -Continue Entresto, spironolactone, metoprolol, Lasix    # Yuly-Yuly disease  -Continue topical triamcinolone     #Diabetes  Patient is taking metformin at home.   Continue sliding scale insulin.     #Rheumatoid arthritis  Patient is on Plaquenil and methotrexate and folic acid.     #Hyperlipidemia  Continue Lipitor 40 mg nightly     # Thrombocytopenia  Monitor platelets closely     # Renal failure, been stable, CKD stage IV.  Unknown baseline  Daily labs  FU OP     #Mood disorder   -Continue home Cymbalta        #BMI (Calculated): 39.16    I have personally reviewed the radiographs, laboratory data in Epic and decisions and statements

## 2024-08-07 NOTE — H&P
Radiology History and Physical    Patient: Sabina Heath 79 y.o. female       Chief Complaint: Fall      History of Present Illness: 79 year old woman with a T7  fracture, after a ground level fall. Has some baseline weakness in the legs due to chronic neuropathy. No new numbness/weakness.    History:    Past Medical History:   Diagnosis Date    Atrial fibrillation (HCC)     Cardiomyopathy (HCC)     CHF (congestive heart failure) (HCC)     Diabetes (HCC)     DVT (deep venous thrombosis) (HCC)     GERD with esophagitis     Hypercholesteremia     Rheumatoid arthritis (HCC)      History reviewed. No pertinent family history.  Social History     Socioeconomic History    Marital status:      Spouse name: Not on file    Number of children: Not on file    Years of education: Not on file    Highest education level: Not on file   Occupational History    Not on file   Tobacco Use    Smoking status: Former    Smokeless tobacco: Never   Substance and Sexual Activity    Alcohol use: Not Currently    Drug use: Yes     Types: Opiates     Sexual activity: Not on file   Other Topics Concern    Not on file   Social History Narrative    Not on file     Social Determinants of Health     Financial Resource Strain: Not on file   Food Insecurity: No Food Insecurity (8/3/2024)    Hunger Vital Sign     Worried About Running Out of Food in the Last Year: Never true     Ran Out of Food in the Last Year: Never true   Transportation Needs: No Transportation Needs (8/3/2024)    PRAPARE - Transportation     Lack of Transportation (Medical): No     Lack of Transportation (Non-Medical): No   Physical Activity: Not on file   Stress: Not on file   Social Connections: Moderately Integrated (8/7/2024)    Social Connections ([facility] HRSN)     If for any reason you need help with day-to-day activities such as bathing, preparing meals, shopping, managing finances, etc., do you get the help you need?: Not on file   Intimate Partner Violence: Not on

## 2024-08-08 PROBLEM — N17.9 AKI (ACUTE KIDNEY INJURY) (HCC): Status: RESOLVED | Noted: 2024-08-01 | Resolved: 2024-08-08

## 2024-08-08 LAB
GLUCOSE BLD STRIP.AUTO-MCNC: 133 MG/DL (ref 65–117)
GLUCOSE BLD STRIP.AUTO-MCNC: 170 MG/DL (ref 65–117)
GLUCOSE BLD STRIP.AUTO-MCNC: 192 MG/DL (ref 65–117)
SERVICE CMNT-IMP: ABNORMAL

## 2024-08-08 PROCEDURE — 6370000000 HC RX 637 (ALT 250 FOR IP): Performed by: HOSPITALIST

## 2024-08-08 PROCEDURE — 1100000000 HC RM PRIVATE

## 2024-08-08 PROCEDURE — 97530 THERAPEUTIC ACTIVITIES: CPT

## 2024-08-08 PROCEDURE — 6370000000 HC RX 637 (ALT 250 FOR IP): Performed by: STUDENT IN AN ORGANIZED HEALTH CARE EDUCATION/TRAINING PROGRAM

## 2024-08-08 PROCEDURE — 2580000003 HC RX 258: Performed by: HOSPITALIST

## 2024-08-08 PROCEDURE — 94761 N-INVAS EAR/PLS OXIMETRY MLT: CPT

## 2024-08-08 PROCEDURE — 82962 GLUCOSE BLOOD TEST: CPT

## 2024-08-08 PROCEDURE — 97535 SELF CARE MNGMENT TRAINING: CPT

## 2024-08-08 PROCEDURE — 97116 GAIT TRAINING THERAPY: CPT

## 2024-08-08 RX ORDER — OXYCODONE AND ACETAMINOPHEN 7.5; 325 MG/1; MG/1
1 TABLET ORAL EVERY 6 HOURS PRN
Qty: 12 TABLET | Refills: 0 | Status: SHIPPED | OUTPATIENT
Start: 2024-08-08 | End: 2024-08-11

## 2024-08-08 RX ORDER — LIDOCAINE 4 G/G
1 PATCH TOPICAL DAILY
Qty: 15 EACH | Refills: 0 | Status: SHIPPED | OUTPATIENT
Start: 2024-08-09 | End: 2024-08-24

## 2024-08-08 RX ORDER — ONDANSETRON 4 MG/1
4 TABLET, ORALLY DISINTEGRATING ORAL EVERY 8 HOURS PRN
Qty: 30 TABLET | Refills: 0 | Status: SHIPPED | OUTPATIENT
Start: 2024-08-08 | End: 2024-08-23

## 2024-08-08 RX ORDER — SENNA AND DOCUSATE SODIUM 50; 8.6 MG/1; MG/1
1 TABLET, FILM COATED ORAL DAILY PRN
Qty: 15 TABLET | Refills: 0 | Status: SHIPPED | OUTPATIENT
Start: 2024-08-08

## 2024-08-08 RX ORDER — PANTOPRAZOLE SODIUM 40 MG/1
40 TABLET, DELAYED RELEASE ORAL
Qty: 30 TABLET | Refills: 3 | Status: SHIPPED | OUTPATIENT
Start: 2024-08-09

## 2024-08-08 RX ORDER — TRIAMCINOLONE ACETONIDE 1 MG/G
CREAM TOPICAL
Qty: 100 G | Refills: 1 | Status: SHIPPED | OUTPATIENT
Start: 2024-08-08

## 2024-08-08 RX ORDER — SPIRONOLACTONE 25 MG/1
25 TABLET ORAL DAILY
Qty: 30 TABLET | Refills: 3 | Status: SHIPPED | OUTPATIENT
Start: 2024-08-09

## 2024-08-08 RX ORDER — METOPROLOL SUCCINATE 50 MG/1
50 TABLET, EXTENDED RELEASE ORAL DAILY
Qty: 30 TABLET | Refills: 3 | Status: SHIPPED | OUTPATIENT
Start: 2024-08-09

## 2024-08-08 RX ADMIN — FUROSEMIDE 20 MG: 20 TABLET ORAL at 09:45

## 2024-08-08 RX ADMIN — SACUBITRIL AND VALSARTAN 1 TABLET: 24; 26 TABLET, FILM COATED ORAL at 20:16

## 2024-08-08 RX ADMIN — OXYCODONE AND ACETAMINOPHEN 1 TABLET: 7.5; 325 TABLET ORAL at 00:11

## 2024-08-08 RX ADMIN — OXYCODONE AND ACETAMINOPHEN 1 TABLET: 7.5; 325 TABLET ORAL at 05:44

## 2024-08-08 RX ADMIN — TRIAMCINOLONE ACETONIDE: 1 CREAM TOPICAL at 09:49

## 2024-08-08 RX ADMIN — FOLIC ACID TAB 400 MCG 400 MCG: 400 TAB at 09:46

## 2024-08-08 RX ADMIN — HYDROXYCHLOROQUINE SULFATE 200 MG: 200 TABLET ORAL at 20:16

## 2024-08-08 RX ADMIN — SACUBITRIL AND VALSARTAN 1 TABLET: 24; 26 TABLET, FILM COATED ORAL at 09:46

## 2024-08-08 RX ADMIN — METOPROLOL SUCCINATE 50 MG: 50 TABLET, EXTENDED RELEASE ORAL at 09:45

## 2024-08-08 RX ADMIN — OXYCODONE HYDROCHLORIDE AND ACETAMINOPHEN 500 MG: 500 TABLET ORAL at 09:45

## 2024-08-08 RX ADMIN — ATORVASTATIN CALCIUM 40 MG: 20 TABLET, FILM COATED ORAL at 20:16

## 2024-08-08 RX ADMIN — HYDROXYCHLOROQUINE SULFATE 200 MG: 200 TABLET ORAL at 09:46

## 2024-08-08 RX ADMIN — OXYCODONE AND ACETAMINOPHEN 1 TABLET: 7.5; 325 TABLET ORAL at 13:10

## 2024-08-08 RX ADMIN — SODIUM CHLORIDE, PRESERVATIVE FREE 10 ML: 5 INJECTION INTRAVENOUS at 20:16

## 2024-08-08 RX ADMIN — RIVAROXABAN 15 MG: 15 TABLET, FILM COATED ORAL at 18:00

## 2024-08-08 RX ADMIN — Medication 400 MG: at 09:45

## 2024-08-08 RX ADMIN — SPIRONOLACTONE 25 MG: 25 TABLET ORAL at 09:45

## 2024-08-08 RX ADMIN — PANTOPRAZOLE SODIUM 40 MG: 40 TABLET, DELAYED RELEASE ORAL at 05:40

## 2024-08-08 RX ADMIN — OXYCODONE AND ACETAMINOPHEN 1 TABLET: 7.5; 325 TABLET ORAL at 20:16

## 2024-08-08 RX ADMIN — DULOXETINE HYDROCHLORIDE 60 MG: 30 CAPSULE, DELAYED RELEASE ORAL at 09:45

## 2024-08-08 RX ADMIN — TRIAMCINOLONE ACETONIDE: 1 CREAM TOPICAL at 21:00

## 2024-08-08 ASSESSMENT — PAIN DESCRIPTION - LOCATION
LOCATION: BACK
LOCATION: BACK
LOCATION: BACK;SHOULDER
LOCATION: BACK

## 2024-08-08 ASSESSMENT — PAIN DESCRIPTION - ORIENTATION
ORIENTATION: POSTERIOR
ORIENTATION: LEFT
ORIENTATION: MID;RIGHT
ORIENTATION: POSTERIOR

## 2024-08-08 ASSESSMENT — PAIN SCALES - GENERAL
PAINLEVEL_OUTOF10: 7
PAINLEVEL_OUTOF10: 7
PAINLEVEL_OUTOF10: 8
PAINLEVEL_OUTOF10: 0
PAINLEVEL_OUTOF10: 8

## 2024-08-08 ASSESSMENT — PAIN DESCRIPTION - DESCRIPTORS
DESCRIPTORS: DISCOMFORT
DESCRIPTORS: DISCOMFORT
DESCRIPTORS: ACHING;DISCOMFORT
DESCRIPTORS: ACHING

## 2024-08-08 NOTE — PLAN OF CARE
and maría. Recommended pt acquire at least dressing stick and sock aide for home use however pt stated she will probably acquire the entire hip kit. She was left seated in chair.               PLAN :  Patient continues to benefit from skilled intervention to address the above impairments.  Continue treatment per established plan of care to address goals.    Recommend with staff: Adl's, there ex, there act    Recommend next OT session: cont towards goals    Recommendation for discharge: (in order for the patient to meet his/her long term goals): Therapy up to 5 days/week in Skilled nursing facility    Other factors to consider for discharge: concern for safely navigating or managing the home environment    IF patient discharges home will need the following DME:        SUBJECTIVE:   Patient stated “am moving better today.”    OBJECTIVE DATA SUMMARY:   Cognitive/Behavioral Status:  Orientation  Overall Orientation Status: Within Normal Limits  Orientation Level: Oriented X4       Functional Mobility and Transfers for ADLs:  Bed Mobility:      Not tested as pt already up in chair  Transfers:   Transfer Training  Overall Level of Assistance: Assist X1;Contact-guard assistance;Stand-by assistance;Additional time  Interventions: Verbal cues           Balance:     Balance  Sitting: Intact  Standing: With support  Standing - Static: Constant support;Good  Standing - Dynamic: Constant support;Fair      ADL Intervention:         Feeding: Modified independent                   UE Bathing: Stand by assistance                       LE Dressing: Minimal assistance  LE Dressing Skilled Clinical Factors: able to do L sock, used sock aide for R sock and donned maternity underwear on commode    Toileting: Stand by assistance  Toileting Skilled Clinical Factors: managed underwear up/down                    Skin Care: Bath wipes;Chlorhexidine wipes        Activity Tolerance:   Fair   Please refer to the flowsheet for vital signs taken  during this treatment.    After treatment:   Patient left in no apparent distress sitting up in chair and Call bell within reach    COMMUNICATION/EDUCATION:   The patient's plan of care was discussed with: occupational therapist and registered nurse    Patient Education  Education Given To: Patient  Education Provided: Role of Therapy;Plan of Care;ADL Adaptive Strategies  Education Method: Verbal  Barriers to Learning: None  Education Outcome: Verbalized understanding    Thank you for this referral.  CHILO Walters/L  Minutes: 16

## 2024-08-08 NOTE — PLAN OF CARE
Problem: Chronic Conditions and Co-morbidities  Goal: Patient's chronic conditions and co-morbidity symptoms are monitored and maintained or improved  Outcome: Progressing     Problem: Discharge Planning  Goal: Discharge to home or other facility with appropriate resources  8/8/2024 0123 by Sonia Correa LPN  Outcome: Progressing  8/7/2024 1202 by Bryan Mae RN  Outcome: Progressing     Problem: Safety - Adult  Goal: Free from fall injury  8/8/2024 0123 by Sonia Correa LPN  Outcome: Progressing  8/7/2024 1202 by Bryan Mae RN  Outcome: Progressing     Problem: Pain  Goal: Verbalizes/displays adequate comfort level or baseline comfort level  8/8/2024 0123 by Sonia Correa LPN  Outcome: Progressing  8/7/2024 1202 by Bryan Mae RN  Outcome: Progressing     Problem: Physical Therapy - Adult  Goal: By Discharge: Performs mobility at highest level of function for planned discharge setting.  See evaluation for individualized goals.  Description: FUNCTIONAL STATUS PRIOR TO ADMISSION: Patient was modified independent using a rollator for functional mobility. Two or three falls falls per year reported.     HOME SUPPORT PRIOR TO ADMISSION: The patient lived alone with no local support. Does have a son nearby that she can stay with for a little while.    Physical Therapy Goals  Initiated 8/3/2024  1.  Patient will move from supine to sit and sit to supine, scoot up and down, and roll side to side in bed with independence within 7 day(s).    2.  Patient will perform sit to stand with independence within 7 day(s).  3.  Patient will transfer from bed to chair and chair to bed with independence using the least restrictive device within 7 day(s).  4.  Patient will ambulate with modified independence for 100 feet with the least restrictive device within 7 day(s).       8/7/2024 1437 by Nicolette Jacobs, PT  Outcome: Progressing     Problem: Occupational Therapy - Adult  Goal: By Discharge: Performs self-care

## 2024-08-08 NOTE — PROGRESS NOTES
Spiritual Health Assessment/Progress Note  [facility]    Initial Encounter,  , Grief and loss,      Name: Sabina Heath MRN: [id number]    Age: 79 y.o.     Sex: female   Language: English   Nondenominational: Other   JULY (acute kidney injury) (HCC)     Date: 8/8/2024            Total Time Calculated: 21 min              Spiritual Assessment began in [facility]        Referral/Consult From: Rounding   Encounter Overview/Reason: Initial Encounter  Service Provided For: Patient    Tamica, Belief, Meaning:   Patient is connected with a tamica tradition or spiritual practice and has beliefs or practices that help with coping during difficult timesPatient shared how she is a devout Baptist. Patient shared that she is a B[employer]ostal. Patient is a Sikhism member of the [employer] that her son attends. Besides that the patient has not found a personal Sikhism to attend.   Family/Friends are connected with a tamica tradition or spiritual practice. Patient's son is [employer].       Importance and Influence:  Patient has spiritual/personal beliefs that influence decisions regarding their health. The patient's tamica is devout. The patient shared her tamica with me. God comes first in her life. God is with the patient. Patient continually talks about God.  Family/Friends have spiritual/personal beliefs that influence decisions regarding the patient's health and no family/friends present    Community:  Patient feels well-supported. Support system includes: Extended family. Patient has not really found a spiritual community. Patient is concerned about her son's medical condition. Patient is still grieving the loss of her . Patient is . Patient at time appears lonely in the conversation.   Family/Friends are connected with a spiritual community:    Assessment and Plan of Care:     Patient Interventions include: Facilitated expression of thoughts and feelings  Family/Friends

## 2024-08-08 NOTE — CARE COORDINATION
8/8/2024 5:36 PM Spoke with Asha in admissions at [facility] who reported she has spoken with 4 people with [facility] and her  and they are finding pt's Medicare is not active. CM confirmed they are finding this for Medicare and NOT Medicaid. Asha confirmed.  CM called to Medicare(1-800-Medicare) multiple times to confirm pt's status. On automated system, confirmed pt's Medicare A&B have been active since 2002. Also automated system reported pt has a Medicare Advantage plan through [employer].   CM called to [facility] Medicare, spoke with Abby MOSHER(call reference # [id number]) who confirmed pt's [facility] Medicare is active. Abby did report pt's Medicaid part of her plan termed in June.  ELAYNE spoke with pt's Granddaughter, Love([phone]) multiple times who was able to access pt's [facility] online account and it shows as active. Confirmed ID for [facility] we have in EPIC is same as in [facility] online account. Love also confirmed pt's premiums for Medicare part B come out of pt's social security.   Love also accessed pt's Medicare account with Medicare.gov. On this system it shows pt's ID # as [id number]. ID # under Medicare that start with H typically are under a [employer] plan.   CM checked on Availity under [employer] portal and the provided H ID # did not populate pt.  Pt's Granddaughter requested a UAI for pt to assist with re enrolling pt in Medicaid. CM will complete UAI on 8/9.  REGULO Rosa

## 2024-08-08 NOTE — PLAN OF CARE
Problem: Chronic Conditions and Co-morbidities  Goal: Patient's chronic conditions and co-morbidity symptoms are monitored and maintained or improved  8/8/2024 1146 by Bryan Mae RN  Outcome: Progressing  8/8/2024 0123 by Sonia Correa LPN  Outcome: Progressing     Problem: Discharge Planning  Goal: Discharge to home or other facility with appropriate resources  8/8/2024 1146 by Bryan Mae RN  Outcome: Progressing  8/8/2024 0123 by Sonia Correa LPN  Outcome: Progressing     Problem: Safety - Adult  Goal: Free from fall injury  8/8/2024 1146 by Bryan Mae RN  Outcome: Progressing  8/8/2024 0123 by Sonia Correa LPN  Outcome: Progressing     Problem: Pain  Goal: Verbalizes/displays adequate comfort level or baseline comfort level  8/8/2024 1146 by Bryan Mae RN  Outcome: Progressing  8/8/2024 0123 by Sonia Correa LPN  Outcome: Progressing     Problem: Skin/Tissue Integrity  Goal: Absence of new skin breakdown  Description: 1.  Monitor for areas of redness and/or skin breakdown  2.  Assess vascular access sites hourly  3.  Every 4-6 hours minimum:  Change oxygen saturation probe site  4.  Every 4-6 hours:  If on nasal continuous positive airway pressure, respiratory therapy assess nares and determine need for appliance change or resting period.  8/8/2024 1146 by Bryan Mae RN  Outcome: Progressing  8/8/2024 0123 by Sonia Correa LPN  Outcome: Progressing

## 2024-08-08 NOTE — PROGRESS NOTES
Hospitalist Progress Note      NAME:  Sabina Heath   :  1945  MRM:  [id number]    Date/Time: 2024  10:59 AM           Assessment / Plan:     79-year-old female with a history of A-fib, diabetes, rheumatoid arthritis, hyperlipidemia, renal failure comes to the hospital after having a fall at home.  Patient was found to have a T7 compression fracture.  Patient is admitted for pain control, PT OT evaluation and IR consult, currently pending SNF     #T7 compression fracture  #Fall  Pain control, PT OT  Interventional radiology consult for vertebroplasty   MRI  Multilevel degenerative plate osteophytes, facet arthropathy, epidurallipomatosis, and small disc herniations. Multilevel mild spinal canal stenosis,  most severe at T3-T4. Multilevel bilateral foraminal stenosis, most severe at  T1-T2, T2-T3, T3-T4, T7-T8, T8-T9, and T9-T10.  T7 acute compression fracture.  Plan  Patient has tolerated Well with no complications  Pending SNF       Multilevel degenerative plate osteophytes  As reported on MRI  Plan  Follow-up outpatient discussed with the patient    # Atrial fibrillation  Patient has a pacemaker and defibrillator.    Last EF 40 to 45% with mild MVR, repeated echo was EF 50-55%  Plan  Continue metoprolol    Restart Xarelto     #CHF  -Not in exacerbation  Repeated TTE with EF 50-55%  Plan  -Continue Entresto, spironolactone, metoprolol, Lasix    # Yuly-Yuly disease  -Continue topical triamcinolone     #Diabetes  Patient is taking metformin at home.   Continue sliding scale insulin.     #Rheumatoid arthritis  Patient is on Plaquenil and methotrexate and folic acid.     #Hyperlipidemia  Continue Lipitor 40 mg nightly     # Thrombocytopenia  Monitor platelets closely     # Renal failure, been stable, CKD stage IV.  Unknown baseline  Daily labs  FU OP     #Mood disorder   -Continue home Cymbalta        #BMI (Calculated): 39.16    I have personally reviewed the radiographs, laboratory data in Epic

## 2024-08-08 NOTE — CARE COORDINATION
2:17 PM  Patients insurance is showing as not valid. CM supervisor working with family and Medicare to resolve issue. Patient will not discharge today. CM updated attending. KTAHE    8/8/24   10:24 AM    *CM noted dc order, patient does not have insurance auth yet, hopefully will be received today. CM updated attending and nursing.     CM called back to admissions and spoke to Asha who said they have now reached out to their [facility] Medicare specialist to assist. KATHE    Case Management Daily Progress Note     Received and reviewed handoff from previous CM.      Reason for Admission:      1. Compression fracture of T7 vertebra, initial encounter ([facility])    2. Fall, initial encounter    3. JULY (acute kidney injury) ([facility])    4. Chronic systolic congestive heart failure ([facility])          RUR: 14%  LOS: 7      Transition of care plan:    1). Specialists consulted:IR- Kyphoplasty yesterday  2). PT/OT recommendations: SNF- CM called admissions at the [facility] to check on the status of insurance auth- patient's insurance number has changed due to patient losing her Medicare/Medicaid dual plan in June and in now only [facility] Medicare and the number they have been running to start auth is not working likely due to this issue. CM called and spoke to patient access and requested a check on the number and said the number on file is the correct number but also gave CM her Medicare number to try which is [id number]. CM called back to admissions and they are trying to start auth with that number. DC pending receiving auth.  3). Transportation at dc: Family  4). CM following for dc needs    Gladys Thompson MSW  [facility]      Available via [employer]

## 2024-08-08 NOTE — PLAN OF CARE
recommendations for staff: Recommend mobility with staff assist x1 using gait belt and rolling walker.      Recommendation for discharge: (in order for the patient to meet his/her long term goals): Therapy 2x a week in the home, however, may require supervision, cognitive and/or physical assistance due to the following concerns listed below:    Other factors to consider for discharge: high risk for falls and concern for safely navigating or managing the home environment    IF patient discharges home will need the following DME: continuing to assess with progress       SUBJECTIVE:   Patient stated, \"doing ok .\"    OBJECTIVE DATA SUMMARY:   Critical Behavior:          Functional Mobility Training:  Bed Mobility:     Transfers:  Transfer Training  Overall Level of Assistance: Assist X1;Contact-guard assistance;Stand-by assistance;Additional time  Interventions: Verbal cues  Balance:  Balance  Sitting: Intact  Standing: With support  Standing - Static: Constant support;Good  Standing - Dynamic: Constant support;Fair   Ambulation/Gait Training:     Gait  Distance (ft): 100 Feet  Assistive Device: Walker, rollator;Gait belt  Interventions: Verbal cues;Safety awareness training  Gait Abnormalities: Trunk sway increased;Decreased step clearance        Neuro Re-Education:                    Pain Rating:  3/10 back pain  Pain Intervention(s):   rest    Activity Tolerance:   Good    After treatment:   Patient left in no apparent distress sitting up in chair, Call bell within reach, and Bed/ chair alarm activated      COMMUNICATION/EDUCATION:   The patient's plan of care was discussed with: registered nurse    Patient Education  Education Given To: Patient  Education Provided: Role of Therapy;Transfer Training;Precautions  Education Method: Verbal  Barriers to Learning: None  Education Outcome: Verbalized understanding;Continued education needed      Haydee Horan PTA  Minutes: 25

## 2024-08-09 LAB
GLUCOSE BLD STRIP.AUTO-MCNC: 133 MG/DL (ref 65–117)
GLUCOSE BLD STRIP.AUTO-MCNC: 149 MG/DL (ref 65–117)
GLUCOSE BLD STRIP.AUTO-MCNC: 161 MG/DL (ref 65–117)
GLUCOSE BLD STRIP.AUTO-MCNC: 175 MG/DL (ref 65–117)
SERVICE CMNT-IMP: ABNORMAL

## 2024-08-09 PROCEDURE — 6370000000 HC RX 637 (ALT 250 FOR IP): Performed by: STUDENT IN AN ORGANIZED HEALTH CARE EDUCATION/TRAINING PROGRAM

## 2024-08-09 PROCEDURE — 94761 N-INVAS EAR/PLS OXIMETRY MLT: CPT

## 2024-08-09 PROCEDURE — 97535 SELF CARE MNGMENT TRAINING: CPT

## 2024-08-09 PROCEDURE — 97116 GAIT TRAINING THERAPY: CPT

## 2024-08-09 PROCEDURE — 2580000003 HC RX 258: Performed by: HOSPITALIST

## 2024-08-09 PROCEDURE — 1100000000 HC RM PRIVATE

## 2024-08-09 PROCEDURE — 82962 GLUCOSE BLOOD TEST: CPT

## 2024-08-09 PROCEDURE — 6370000000 HC RX 637 (ALT 250 FOR IP): Performed by: HOSPITALIST

## 2024-08-09 PROCEDURE — 97530 THERAPEUTIC ACTIVITIES: CPT

## 2024-08-09 RX ADMIN — OXYCODONE HYDROCHLORIDE AND ACETAMINOPHEN 500 MG: 500 TABLET ORAL at 09:17

## 2024-08-09 RX ADMIN — RIVAROXABAN 15 MG: 15 TABLET, FILM COATED ORAL at 17:38

## 2024-08-09 RX ADMIN — OXYCODONE AND ACETAMINOPHEN 1 TABLET: 7.5; 325 TABLET ORAL at 02:15

## 2024-08-09 RX ADMIN — SODIUM CHLORIDE, PRESERVATIVE FREE 10 ML: 5 INJECTION INTRAVENOUS at 09:18

## 2024-08-09 RX ADMIN — SACUBITRIL AND VALSARTAN 1 TABLET: 24; 26 TABLET, FILM COATED ORAL at 09:17

## 2024-08-09 RX ADMIN — OXYCODONE AND ACETAMINOPHEN 1 TABLET: 7.5; 325 TABLET ORAL at 17:40

## 2024-08-09 RX ADMIN — DULOXETINE HYDROCHLORIDE 60 MG: 30 CAPSULE, DELAYED RELEASE ORAL at 09:17

## 2024-08-09 RX ADMIN — FUROSEMIDE 20 MG: 20 TABLET ORAL at 09:17

## 2024-08-09 RX ADMIN — TRIAMCINOLONE ACETONIDE: 1 CREAM TOPICAL at 09:16

## 2024-08-09 RX ADMIN — SPIRONOLACTONE 25 MG: 25 TABLET ORAL at 09:17

## 2024-08-09 RX ADMIN — SACUBITRIL AND VALSARTAN 1 TABLET: 24; 26 TABLET, FILM COATED ORAL at 21:30

## 2024-08-09 RX ADMIN — OXYCODONE AND ACETAMINOPHEN 1 TABLET: 7.5; 325 TABLET ORAL at 23:55

## 2024-08-09 RX ADMIN — OXYCODONE AND ACETAMINOPHEN 1 TABLET: 7.5; 325 TABLET ORAL at 09:29

## 2024-08-09 RX ADMIN — TRIAMCINOLONE ACETONIDE: 1 CREAM TOPICAL at 21:20

## 2024-08-09 RX ADMIN — METOPROLOL SUCCINATE 50 MG: 50 TABLET, EXTENDED RELEASE ORAL at 09:18

## 2024-08-09 RX ADMIN — FOLIC ACID TAB 400 MCG 400 MCG: 400 TAB at 09:17

## 2024-08-09 RX ADMIN — HYDROXYCHLOROQUINE SULFATE 200 MG: 200 TABLET ORAL at 09:17

## 2024-08-09 RX ADMIN — PANTOPRAZOLE SODIUM 40 MG: 40 TABLET, DELAYED RELEASE ORAL at 05:56

## 2024-08-09 RX ADMIN — SODIUM CHLORIDE, PRESERVATIVE FREE 10 ML: 5 INJECTION INTRAVENOUS at 21:30

## 2024-08-09 RX ADMIN — Medication 400 MG: at 09:17

## 2024-08-09 RX ADMIN — HYDROXYCHLOROQUINE SULFATE 200 MG: 200 TABLET ORAL at 21:30

## 2024-08-09 RX ADMIN — ATORVASTATIN CALCIUM 40 MG: 20 TABLET, FILM COATED ORAL at 21:30

## 2024-08-09 ASSESSMENT — PAIN DESCRIPTION - ORIENTATION
ORIENTATION: MID

## 2024-08-09 ASSESSMENT — PAIN SCALES - GENERAL
PAINLEVEL_OUTOF10: 5
PAINLEVEL_OUTOF10: 0
PAINLEVEL_OUTOF10: 6
PAINLEVEL_OUTOF10: 3
PAINLEVEL_OUTOF10: 2
PAINLEVEL_OUTOF10: 5

## 2024-08-09 ASSESSMENT — PAIN DESCRIPTION - LOCATION
LOCATION: BACK

## 2024-08-09 ASSESSMENT — PAIN DESCRIPTION - DESCRIPTORS
DESCRIPTORS: ACHING;DISCOMFORT;TENDER;PRESSURE
DESCRIPTORS: ACHING
DESCRIPTORS: ACHING

## 2024-08-09 ASSESSMENT — PAIN - FUNCTIONAL ASSESSMENT: PAIN_FUNCTIONAL_ASSESSMENT: ACTIVITIES ARE NOT PREVENTED

## 2024-08-09 NOTE — PLAN OF CARE
None  Education Outcome: Verbalized understanding    Thank you for this referral.  CHILO Walters/L  Minutes: 15

## 2024-08-09 NOTE — PLAN OF CARE
Problem: Chronic Conditions and Co-morbidities  Goal: Patient's chronic conditions and co-morbidity symptoms are monitored and maintained or improved  Outcome: Progressing  Flowsheets (Taken 8/5/2024 0744 by Laurel Holt LPN)  Care Plan - Patient's Chronic Conditions and Co-Morbidity Symptoms are Monitored and Maintained or Improved:   Monitor and assess patient's chronic conditions and comorbid symptoms for stability, deterioration, or improvement   Collaborate with multidisciplinary team to address chronic and comorbid conditions and prevent exacerbation or deterioration   Update acute care plan with appropriate goals if chronic or comorbid symptoms are exacerbated and prevent overall improvement and discharge     Problem: Safety - Adult  Goal: Free from fall injury  Outcome: Progressing  Flowsheets (Taken 8/6/2024 1138)  Free From Fall Injury: Instruct family/caregiver on patient safety

## 2024-08-09 NOTE — PLAN OF CARE
Problem: Physical Therapy - Adult  Goal: By Discharge: Performs mobility at highest level of function for planned discharge setting.  See evaluation for individualized goals.  Description: FUNCTIONAL STATUS PRIOR TO ADMISSION: Patient was modified independent using a rollator for functional mobility. Two or three falls falls per year reported.     HOME SUPPORT PRIOR TO ADMISSION: The patient lived alone with no local support. Does have a son nearby that she can stay with for a little while.    Physical Therapy Goals  Initiated 8/3/2024  1.  Patient will move from supine to sit and sit to supine, scoot up and down, and roll side to side in bed with independence within 7 day(s).    2.  Patient will perform sit to stand with independence within 7 day(s).  3.  Patient will transfer from bed to chair and chair to bed with independence using the least restrictive device within 7 day(s).  4.  Patient will ambulate with modified independence for 100 feet with the least restrictive device within 7 day(s).       Outcome: Progressing   PHYSICAL THERAPY TREATMENT    Patient: Sabina Heath (79 y.o. female)  Date: 8/9/2024  Diagnosis: JULY (acute kidney injury) ([facility]) [N17.9]  Fall, initial encounter [W19.XXXA]  Compression fracture of T7 vertebra, initial encounter ([facility]) [S22.060A] JULY (acute kidney injury) ([facility])      Precautions:                        ASSESSMENT:  Patient continues to benefit from skilled PT services and is progressing towards goals. Pt report 5/10 back pain tho agreeable to participate with therapy. Requires increase time and CGA/ SBA mobility tasks. Tolerated increased gait training with NGUEYN noted and one seated rest break. Fatigued with increased activity.          PLAN:  Patient continues to benefit from skilled intervention to address the above impairments.  Continue treatment per established plan of care.        Recommendation for discharge: (in order for the patient to meet his/her long term goals):

## 2024-08-09 NOTE — CARE COORDINATION
8/9/24   11:48 AM      Case Management Daily Progress Note     Received and reviewed handoff from previous CM.      Reason for Admission:      1. Compression fracture of T7 vertebra, initial encounter ([facility])    2. Fall, initial encounter    3. JULY (acute kidney injury) ([facility])    4. Chronic systolic congestive heart failure (HCC)          RUR:   LOS: 8      Transition of care plan:    1). Specialists consulted:None  2). PT/OT recommendations: SNF  3). Transportation at dc: Family  4). CM following for dc needs    CM received notification from patients granddaughter, Love that patient's Medicaid has been reinstated and she received her Medicaid number which is [id number]. Love also let CM know that Tavia Maddox was able to submit for auth with patients [facility] Medicare- awaiting response from [facility] Medicare    DALTON Davis  [facility]      Available via [employer]

## 2024-08-09 NOTE — PROGRESS NOTES
Formula  Weight Used for Energy Requirements: Current    Energy (kcal/day): 1724 kcal/d (MSJ x1.2)  Weight Used for Protein Requirements: Current  Protein (g/day): 80 g/d (0.8 g/kg)  Method Used for Fluid Requirements: 1 ml/kcal  Fluid (ml/day): 1724 mL/d    Nutrition Related Findings:   Edema: Left lower extremity, Right lower extremity            RLE Edema: Trace  LLE Edema: Trace    Bowel Movement:  Last BM (including prior to admit): 08/08/24 (per patient)    Wounds: Wound Type: None    Nutrition Related Labs:  Lab Results   Component Value Date    CREATININE 1.82 (H) 08/06/2024    BUN 33 (H) 08/06/2024     08/06/2024    K 4.7 08/06/2024     08/06/2024    CO2 28 08/06/2024     Lab Results   Component Value Date/Time    POCGLU 161 08/09/2024 11:16 AM    POCGLU 133 08/09/2024 07:57 AM    POCGLU 192 08/08/2024 08:24 PM    POCGLU 170 08/08/2024 04:17 PM    POCGLU 133 08/08/2024 08:25 AM    POCGLU 154 08/07/2024 09:26 PM     Hemoglobin A1C   Date Value Ref Range Status   08/01/2024 6.3 (H) 4.0 - 5.6 % Final     Comment:     (NOTE)  HbA1C Interpretive Ranges  <5.7              Normal  5.7 - 6.4         Consider Prediabetes  >6.5              Consider Diabetes       Lab Results   Component Value Date    CALCIUM 9.2 08/06/2024     Current Nutrition Therapies:  Diet: ADULT DIET; Regular; 5 carb choices (75 gm/meal); Low Sodium (2 gm)      Meal Intake:   Patient Vitals for the past 168 hrs:   PO Meals Eaten (%)   08/09/24 0929 76 - 100%   08/08/24 0859 76 - 100%   08/07/24 1743 76 - 100%   08/06/24 0942 76 - 100%   08/06/24 0925 0%   08/02/24 1730 76 - 100%     Supplement Intake:  No data found.    Anthropometric Measures:  Height: 160 cm (5' 3\")  Ideal Body Weight (IBW): 115 lbs (52 kg)       Current Body Weight: 100.2 kg (220 lb 14.4 oz), 192.1 % IBW. Weight Source: Not Specified  Current BMI (kg/m2): 39.1        Weight Adjustment For: No Adjustment                 BMI Categories: Obese Class 2 (BMI 35.0  -39.9)    Wt Readings from Last 10 Encounters:   08/02/24 100.2 kg (221 lb)     Last Weight Metrics:      8/9/2024     2:31 PM 8/2/2024    11:54 AM 8/1/2024     2:32 PM 7/27/2022     4:53 PM   Weight Loss Metrics   Height 5' 3\" 5' 3\" 5' 3\" 5' 5\"   Weight - Scale  221 lbs 221 lbs 205 lbs   BMI (Calculated)  39.2 kg/m2 39.2 kg/m2 34.2 kg/m2       Nutrition Diagnosis:   No nutrition diagnosis at this time         Nutrition Interventions:   Food and/or Nutrient Delivery: Continue Current Diet  Nutrition Education/Counseling: No recommendation at this time  Coordination of Nutrition Care: No recommendation at this time  Plan of Care discussed with: patient    Goals:     Goals: PO intake 75% or greater, prior to discharge       Nutrition Monitoring and Evaluation:   Behavioral-Environmental Outcomes: None Identified  Food/Nutrient Intake Outcomes: Food and Nutrient Intake  Physical Signs/Symptoms Outcomes: None Identified    Discharge Planning:    No discharge needs at this time     JENNIFER MORE RD, MS  Available via [email or web address]  Office # [phone]

## 2024-08-09 NOTE — PROGRESS NOTES
Hospitalist Progress Note      NAME: Sabina Heath   :  1945  MRM:  [id number]    Date/Time: 2024  6:47 AM           Assessment / Plan:   79-year-old female with a history of A-fib, diabetes, rheumatoid arthritis, hyperlipidemia, renal failure comes to the hospital after having a fall at home.  Patient was found to have a T7 compression fracture.  Patient is admitted for pain control, PT OT evaluation and IR consult, currently pending SNF     #T7 compression fracture  #GLF  Pain control, PT OT  Interventional radiology consult for vertebroplasty   MRI: Multilevel degenerative plate osteophytes, facet arthropathy, epidurallipomatosis, and small disc herniations. Multilevel mild spinal canal stenosis, most severe at T3-T4. Multilevel bilateral foraminal stenosis, most severe at T1-T2, T2-T3, T3-T4, T7-T8, T8-T9, and T9-T10.T7 acute compression fracture.  Patient has tolerated Well with no complications  Pending SNF    Multilevel degenerative plate osteophytes  As reported on MRI  Follow-up outpatient discussed with the patient    # Atrial fibrillation  Patient has a pacemaker and defibrillator.   Last EF 40 to 45% with mild MVR, repeated echo was EF 50-55%  Continue metoprolol   Continue Xarelto     #CHF  Stable and Not in exacerbation  Repeated TTE with EF 50-55%  Plan  Continue Entresto, spironolactone, metoprolol, Lasix    # Yuly-Yuly disease  Continue topical triamcinolone     #Diabetes 2  Patient is taking metformin at home.   Continue sliding scale insulin.     #Rheumatoid arthritis  Patient is on Plaquenil and methotrexate and folic acid.     #Hyperlipidemia  Continue Lipitor 40 mg nightly     # Thrombocytopenia  Monitor platelets closely     # Renal failure, been stable, CKD stage IV.  Unknown baseline  Daily labs  FU OP     #Mood disorder   Stable. Continue home Cymbalta      #BMI (Calculated): 39.16:  Stage 2 obesity.   Weight loss advised.    I have personally reviewed the radiographs,  laboratory data in Epic and decisions and statements above are based partially on this personal interpretation.                 Care Plan discussed with: Patient    Discussed:  Care Plan    Prophylaxis: will restart Xarelto     Disposition:  SNF/LTC           ___________________________________________________    Attending Physician: Erick Cantu MD        Subjective:     Chief Complaint: T4 fracture    Subjective:  Resting comfortably with no new complaints. No CP or SOB. Awaiting auth for SNF.          Objective:      Last 24hrs VS reviewed since prior progress note. Most recent are:    Vitals:    08/09/24 0308   BP: (!) 105/59   Pulse: 81   Resp: 17   Temp: 98.1 °F (36.7 °C)   SpO2: 98%     SpO2 Readings from Last 6 Encounters:   08/09/24 98%          Intake/Output Summary (Last 24 hours) at 8/9/2024 0647  Last data filed at 8/8/2024 0859  Gross per 24 hour   Intake 120 ml   Output --   Net 120 ml            Exam:     Physical Exam:  General:  Elderly, alert, well appearing, and no distress  Head: Normocephalic, without obvious abnormality, atraumatic  Eyes: anicteric sclerae and conjuntiva clear  ENT: lips, mucosa, and tongue normal  Neck: normal, supple, and no tenderness  Lungs: clear to auscultation with good breath sounds and normal respiratory effort  Heart: S1, S2 normal, regular rate, and regular rhythm  Abd: not distended, soft, nontender, BS present and normactive  Ext: no cyanosis and no edema  Skin: Yuly-Yuly disease with notable subcutaneous hematoma bilateral upper extremities, stable, skin well moisturized     Neuro:  Alert and oriented  Psych: not anxious, cooperative, appropriate affect        Telemetry reviewed:   normal sinus rhythm    Medications Reviewed: (see below)    Lab Data Reviewed: (see below)    ______________________________________________________________________    Medications:     Current Facility-Administered Medications   Medication Dose Route Frequency    lidocaine 4 %

## 2024-08-09 NOTE — CARE COORDINATION
8/9/2024 9:18 AM UAI completed for pt in DMAS system. CM emailed pt's UAI via safemail to pt's  with DSS to [email or web address].  REGULO Rosa

## 2024-08-10 LAB
GLUCOSE BLD STRIP.AUTO-MCNC: 131 MG/DL (ref 65–117)
GLUCOSE BLD STRIP.AUTO-MCNC: 169 MG/DL (ref 65–117)
GLUCOSE BLD STRIP.AUTO-MCNC: 195 MG/DL (ref 65–117)
SERVICE CMNT-IMP: ABNORMAL

## 2024-08-10 PROCEDURE — 6370000000 HC RX 637 (ALT 250 FOR IP): Performed by: HOSPITALIST

## 2024-08-10 PROCEDURE — 82962 GLUCOSE BLOOD TEST: CPT

## 2024-08-10 PROCEDURE — 1100000000 HC RM PRIVATE

## 2024-08-10 PROCEDURE — 6370000000 HC RX 637 (ALT 250 FOR IP): Performed by: STUDENT IN AN ORGANIZED HEALTH CARE EDUCATION/TRAINING PROGRAM

## 2024-08-10 PROCEDURE — 94761 N-INVAS EAR/PLS OXIMETRY MLT: CPT

## 2024-08-10 PROCEDURE — 2580000003 HC RX 258: Performed by: HOSPITALIST

## 2024-08-10 RX ADMIN — RIVAROXABAN 15 MG: 15 TABLET, FILM COATED ORAL at 17:48

## 2024-08-10 RX ADMIN — FOLIC ACID TAB 400 MCG 400 MCG: 400 TAB at 09:35

## 2024-08-10 RX ADMIN — TRIAMCINOLONE ACETONIDE: 1 CREAM TOPICAL at 09:37

## 2024-08-10 RX ADMIN — SACUBITRIL AND VALSARTAN 1 TABLET: 24; 26 TABLET, FILM COATED ORAL at 09:35

## 2024-08-10 RX ADMIN — SODIUM CHLORIDE, PRESERVATIVE FREE 10 ML: 5 INJECTION INTRAVENOUS at 20:23

## 2024-08-10 RX ADMIN — SPIRONOLACTONE 25 MG: 25 TABLET ORAL at 09:37

## 2024-08-10 RX ADMIN — OXYCODONE AND ACETAMINOPHEN 1 TABLET: 7.5; 325 TABLET ORAL at 09:35

## 2024-08-10 RX ADMIN — Medication 400 MG: at 09:35

## 2024-08-10 RX ADMIN — SODIUM CHLORIDE, PRESERVATIVE FREE 10 ML: 5 INJECTION INTRAVENOUS at 09:44

## 2024-08-10 RX ADMIN — SACUBITRIL AND VALSARTAN 1 TABLET: 24; 26 TABLET, FILM COATED ORAL at 20:22

## 2024-08-10 RX ADMIN — PANTOPRAZOLE SODIUM 40 MG: 40 TABLET, DELAYED RELEASE ORAL at 09:35

## 2024-08-10 RX ADMIN — METOPROLOL SUCCINATE 50 MG: 50 TABLET, EXTENDED RELEASE ORAL at 09:37

## 2024-08-10 RX ADMIN — HYDROXYCHLOROQUINE SULFATE 200 MG: 200 TABLET ORAL at 20:22

## 2024-08-10 RX ADMIN — FUROSEMIDE 20 MG: 20 TABLET ORAL at 09:35

## 2024-08-10 RX ADMIN — TRIAMCINOLONE ACETONIDE: 1 CREAM TOPICAL at 20:23

## 2024-08-10 RX ADMIN — OXYCODONE AND ACETAMINOPHEN 1 TABLET: 7.5; 325 TABLET ORAL at 20:22

## 2024-08-10 RX ADMIN — ATORVASTATIN CALCIUM 40 MG: 20 TABLET, FILM COATED ORAL at 20:23

## 2024-08-10 RX ADMIN — OXYCODONE HYDROCHLORIDE AND ACETAMINOPHEN 500 MG: 500 TABLET ORAL at 09:37

## 2024-08-10 RX ADMIN — HYDROXYCHLOROQUINE SULFATE 200 MG: 200 TABLET ORAL at 09:37

## 2024-08-10 RX ADMIN — DULOXETINE HYDROCHLORIDE 60 MG: 30 CAPSULE, DELAYED RELEASE ORAL at 09:37

## 2024-08-10 ASSESSMENT — PAIN SCALES - GENERAL
PAINLEVEL_OUTOF10: 3
PAINLEVEL_OUTOF10: 9
PAINLEVEL_OUTOF10: 0
PAINLEVEL_OUTOF10: 8
PAINLEVEL_OUTOF10: 3
PAINLEVEL_OUTOF10: 0
PAINLEVEL_OUTOF10: 3
PAINLEVEL_OUTOF10: 9

## 2024-08-10 ASSESSMENT — PAIN DESCRIPTION - ORIENTATION: ORIENTATION: UPPER

## 2024-08-10 ASSESSMENT — PAIN - FUNCTIONAL ASSESSMENT: PAIN_FUNCTIONAL_ASSESSMENT: ACTIVITIES ARE NOT PREVENTED

## 2024-08-10 ASSESSMENT — PAIN DESCRIPTION - LOCATION: LOCATION: BACK

## 2024-08-10 NOTE — PROGRESS NOTES
[facility]  [address] [place], [place] [id number] [phone]    [facility]  Hospitalist Group                                                                                          Hospitalist Progress Note  Andria Guillen MD        Date of Service:  8/10/2024  NAME:  Sabina Heath  :  1945  MRN:  [id number]      Admission Summary:   79-year-old female with a history of A-fib, diabetes, rheumatoid arthritis, hyperlipidemia, renal failure comes to the hospital after having a fall at home. Patient was found to have a T7 compression fracture. Patient is admitted for pain control, PT OT evaluation and IR consult, currently pending SNF     Interval history / Subjective:     Very talkative, states some pressure in he mid back but otherwise no other issues      Assessment & Plan:     T7 compression fracture  GLF  Pain control, PT OT  S/p khypho on    MRI: Multilevel degenerative plate osteophytes, facet arthropathy, epidurallipomatosis, and small disc herniations. Multilevel mild spinal canal stenosis, most severe at T3-T4. Multilevel bilateral foraminal stenosis, T7 acute compression fracture.  Pending SNF     Multilevel degenerative plate osteophytes  As reported on MRI  Follow-up outpatient discussed with the patient     Atrial fibrillation  Patient has a pacemaker and defibrillator.   Last EF 40 to 45% with mild MVR, repeated echo was EF 50-55%  Continue metoprolol   Continue Xarelto      CHF  Stable and Not in exacerbation  Repeated TTE with EF 50-55%  Continue Entresto, spironolactone, metoprolol, Lasix     Yuly-Yuly disease  Continue topical triamcinolone     Diabetes 2  Patient is taking metformin at home.   Continue sliding scale insulin.     Rheumatoid arthritis  Patient is on Plaquenil and methotrexate/folic acid.     Hyperlipidemia  Continue Lipitor 40 mg nightly     Thrombocytopenia  Stable   Monitor platelets closely     Renal  for: \"CPK\"  No results found for: \"CHOL\", \"CHLST\", \"CHOLV\", \"HDL\", \"HDLC\", \"LDL\"  No results found for: \"GLUCPOC\"  No results found for: \"UA\"      Medications Reviewed:     Current Facility-Administered Medications   Medication Dose Route Frequency    lidocaine 4 % external patch 1 patch  1 patch TransDERmal Daily    polyethylene glycol (GLYCOLAX) packet 17 g  17 g Oral Daily    triamcinolone (KENALOG) 0.1 % cream   Topical BID    hydroxychloroquine (PLAQUENIL) tablet 200 mg  200 mg Oral BID    sacubitril-valsartan (ENTRESTO) 24-26 MG per tablet 1 tablet  1 tablet Oral BID    spironolactone (ALDACTONE) tablet 25 mg  25 mg Oral Daily    sodium chloride flush 0.9 % injection 5-40 mL  5-40 mL IntraVENous 2 times per day    sodium chloride flush 0.9 % injection 5-40 mL  5-40 mL IntraVENous PRN    0.9 % sodium chloride infusion   IntraVENous PRN    ondansetron (ZOFRAN-ODT) disintegrating tablet 4 mg  4 mg Oral Q8H PRN    Or    ondansetron (ZOFRAN) injection 4 mg  4 mg IntraVENous Q6H PRN    polyethylene glycol (GLYCOLAX) packet 17 g  17 g Oral Daily PRN    acetaminophen (TYLENOL) tablet 650 mg  650 mg Oral Q6H PRN    Or    acetaminophen (TYLENOL) suppository 650 mg  650 mg Rectal Q6H PRN    ascorbic acid (VITAMIN C) tablet 500 mg  500 mg Oral Daily    albuterol (PROVENTIL) (2.5 MG/3ML) 0.083% nebulizer solution 2.5 mg  2.5 mg Nebulization Q4H PRN    atorvastatin (LIPITOR) tablet 40 mg  40 mg Oral QHS    DULoxetine (CYMBALTA) extended release capsule 60 mg  60 mg Oral Daily    folic acid (FOLVITE) tablet 400 mcg  400 mcg Oral Daily    furosemide (LASIX) tablet 20 mg  20 mg Oral Daily    oxyCODONE-acetaminophen (PERCOCET) 7.5-325 MG per tablet 1 tablet  1 tablet Oral Q6H PRN    pantoprazole (PROTONIX) tablet 40 mg  40 mg Oral QAM AC    sennosides-docusate sodium (SENOKOT-S) 8.6-50 MG tablet 1 tablet  1 tablet Oral Daily PRN    dextrose 10 % infusion   IntraVENous Continuous PRN    rivaroxaban (XARELTO) tablet 15 mg  15 mg

## 2024-08-10 NOTE — CARE COORDINATION
Case Management Progress Note    Discharge Plan:  Anticipate DC to [facility]--Pending AUTH (started on 8/9).  [facility] Medicare.    CM met with patient at bedside and informed her of discharge planning updates.      Patient stated that she has [facility] Medicaid as well, which is not on the face sheet.  CM sent an email to Eusebia Pace with Registration for assistance.    CM to contact patient's granddaughter at DC to provide updates:  Love [phone].    Will continue to follow with SNF auth.    ______________________________________  JAZMINE Young, RN-[facility]- Care Management  Available via [employer]  8/10/2024  2:41 PM

## 2024-08-11 LAB
ANION GAP SERPL CALC-SCNC: 5 MMOL/L (ref 5–15)
BUN SERPL-MCNC: 36 MG/DL (ref 6–20)
BUN/CREAT SERPL: 22 (ref 12–20)
CALCIUM SERPL-MCNC: 8.9 MG/DL (ref 8.5–10.1)
CHLORIDE SERPL-SCNC: 104 MMOL/L (ref 97–108)
CO2 SERPL-SCNC: 27 MMOL/L (ref 21–32)
COMMENT:: NORMAL
CREAT SERPL-MCNC: 1.61 MG/DL (ref 0.55–1.02)
GLUCOSE BLD STRIP.AUTO-MCNC: 129 MG/DL (ref 65–117)
GLUCOSE BLD STRIP.AUTO-MCNC: 167 MG/DL (ref 65–117)
GLUCOSE BLD STRIP.AUTO-MCNC: 188 MG/DL (ref 65–117)
GLUCOSE BLD STRIP.AUTO-MCNC: 198 MG/DL (ref 65–117)
GLUCOSE SERPL-MCNC: 180 MG/DL (ref 65–100)
POTASSIUM SERPL-SCNC: 4.6 MMOL/L (ref 3.5–5.1)
SERVICE CMNT-IMP: ABNORMAL
SODIUM SERPL-SCNC: 136 MMOL/L (ref 136–145)
SPECIMEN HOLD: NORMAL

## 2024-08-11 PROCEDURE — 2580000003 HC RX 258: Performed by: HOSPITALIST

## 2024-08-11 PROCEDURE — 6370000000 HC RX 637 (ALT 250 FOR IP): Performed by: STUDENT IN AN ORGANIZED HEALTH CARE EDUCATION/TRAINING PROGRAM

## 2024-08-11 PROCEDURE — 2580000003 HC RX 258: Performed by: FAMILY MEDICINE

## 2024-08-11 PROCEDURE — 82962 GLUCOSE BLOOD TEST: CPT

## 2024-08-11 PROCEDURE — 80048 BASIC METABOLIC PNL TOTAL CA: CPT

## 2024-08-11 PROCEDURE — 6370000000 HC RX 637 (ALT 250 FOR IP): Performed by: HOSPITALIST

## 2024-08-11 PROCEDURE — 1100000000 HC RM PRIVATE

## 2024-08-11 PROCEDURE — 94761 N-INVAS EAR/PLS OXIMETRY MLT: CPT

## 2024-08-11 RX ORDER — 0.9 % SODIUM CHLORIDE 0.9 %
500 INTRAVENOUS SOLUTION INTRAVENOUS ONCE
Status: COMPLETED | OUTPATIENT
Start: 2024-08-11 | End: 2024-08-12

## 2024-08-11 RX ADMIN — SPIRONOLACTONE 25 MG: 25 TABLET ORAL at 09:37

## 2024-08-11 RX ADMIN — ATORVASTATIN CALCIUM 40 MG: 20 TABLET, FILM COATED ORAL at 21:56

## 2024-08-11 RX ADMIN — TRIAMCINOLONE ACETONIDE: 1 CREAM TOPICAL at 21:56

## 2024-08-11 RX ADMIN — HYDROXYCHLOROQUINE SULFATE 200 MG: 200 TABLET ORAL at 21:56

## 2024-08-11 RX ADMIN — OXYCODONE AND ACETAMINOPHEN 1 TABLET: 7.5; 325 TABLET ORAL at 09:36

## 2024-08-11 RX ADMIN — OXYCODONE AND ACETAMINOPHEN 1 TABLET: 7.5; 325 TABLET ORAL at 16:31

## 2024-08-11 RX ADMIN — FOLIC ACID TAB 400 MCG 400 MCG: 400 TAB at 09:37

## 2024-08-11 RX ADMIN — OXYCODONE AND ACETAMINOPHEN 1 TABLET: 7.5; 325 TABLET ORAL at 02:15

## 2024-08-11 RX ADMIN — TRIAMCINOLONE ACETONIDE: 1 CREAM TOPICAL at 09:37

## 2024-08-11 RX ADMIN — SODIUM CHLORIDE 500 ML: 9 INJECTION, SOLUTION INTRAVENOUS at 16:46

## 2024-08-11 RX ADMIN — DULOXETINE HYDROCHLORIDE 60 MG: 30 CAPSULE, DELAYED RELEASE ORAL at 09:37

## 2024-08-11 RX ADMIN — RIVAROXABAN 15 MG: 15 TABLET, FILM COATED ORAL at 16:31

## 2024-08-11 RX ADMIN — OXYCODONE HYDROCHLORIDE AND ACETAMINOPHEN 500 MG: 500 TABLET ORAL at 09:37

## 2024-08-11 RX ADMIN — HYDROXYCHLOROQUINE SULFATE 200 MG: 200 TABLET ORAL at 09:37

## 2024-08-11 RX ADMIN — PANTOPRAZOLE SODIUM 40 MG: 40 TABLET, DELAYED RELEASE ORAL at 06:41

## 2024-08-11 RX ADMIN — Medication 400 MG: at 09:37

## 2024-08-11 RX ADMIN — SODIUM CHLORIDE, PRESERVATIVE FREE 10 ML: 5 INJECTION INTRAVENOUS at 09:38

## 2024-08-11 RX ADMIN — FUROSEMIDE 20 MG: 20 TABLET ORAL at 09:37

## 2024-08-11 ASSESSMENT — PAIN SCALES - GENERAL
PAINLEVEL_OUTOF10: 7
PAINLEVEL_OUTOF10: 4
PAINLEVEL_OUTOF10: 3
PAINLEVEL_OUTOF10: 8

## 2024-08-11 ASSESSMENT — PAIN DESCRIPTION - LOCATION
LOCATION: SHOULDER;BACK
LOCATION: BACK

## 2024-08-11 ASSESSMENT — PAIN DESCRIPTION - DESCRIPTORS
DESCRIPTORS: ACHING
DESCRIPTORS: ACHING

## 2024-08-11 ASSESSMENT — PAIN DESCRIPTION - ORIENTATION
ORIENTATION: LOWER
ORIENTATION: RIGHT;LOWER

## 2024-08-11 NOTE — PROGRESS NOTES
[facility]  [address] [place] [address] [phone]    [facility] Adult  Hospitalist Group                                                                                          Hospitalist Progress Note  Andria Guillen MD        Date of Service:  2024  NAME:  Sabina Heath  :  1945  MRN:  [id number]      Admission Summary:   79-year-old female with a history of A-fib, diabetes, rheumatoid arthritis, hyperlipidemia, renal failure comes to the hospital after having a fall at home. Patient was found to have a T7 compression fracture. Patient is admitted for pain control, PT OT evaluation and IR consult, currently pending SNF     Interval history / Subjective:   No new complaints     Assessment & Plan:     T7 compression fracture  GLF  Pain control, PT OT  S/p katia on    MRI: Multilevel degenerative plate osteophytes, facet arthropathy, epidurallipomatosis, and small disc herniations. Multilevel mild spinal canal stenosis, most severe at T3-T4. Multilevel bilateral foraminal stenosis, T7 acute compression fracture.  Pending SNF     Multilevel degenerative plate osteophytes  As reported on MRI  Follow-up outpatient discussed with the patient     Atrial fibrillation  Patient has a pacemaker and defibrillator.   Last EF 40 to 45% with mild MVR, repeated echo was EF 50-55%  Continue metoprolol   Continue Xarelto      CHF  Stable and Not in exacerbation  Repeated TTE with EF 50-55%  Continue Entresto, spironolactone, metoprolol, Lasix     Yuly-Yuly disease  Continue topical triamcinolone     Diabetes 2  Patient is taking metformin at home.   Continue sliding scale insulin.     Rheumatoid arthritis  Patient is on Plaquenil and methotrexate/folic acid.     Hyperlipidemia  Continue Lipitor 40 mg nightly     Thrombocytopenia  Stable   Monitor platelets closely     Renal failure, been stable, CKD stage IV.  Unknown baseline  Daily labs  FU  OP      Mood disorder   Stable. Continue home Cymbalta      BMI (Calculated): 39.16:  Stage 2 obesity.   Weight loss advised.    Outisde Records, prior notes, labs, radiology, and medications reviewed     Code status: full  DVT prophylaxis: maria del carmen              Review of Systems:   Pertinent items are noted in HPI.       Vital Signs:    Last 24hrs VS reviewed since prior progress note. Most recent are:  Vitals:    08/11/24 0851   BP: 95/64   Pulse: 78   Resp:    Temp:    SpO2: 97%         Intake/Output Summary (Last 24 hours) at 8/11/2024 1413  Last data filed at 8/10/2024 2308  Gross per 24 hour   Intake 240 ml   Output --   Net 240 ml        Physical Examination:             Constitutional:  No acute distress, cooperative, pleasant    ENT:  Oral mucosa moist, oropharynx benign.    Resp:  CTA bilaterally. No wheezing/rhonchi/rales. No accessory muscle use   CV:  Regular rhythm, normal rate, no murmurs, gallops, rubs    GI:  Soft, non distended, non tender. normoactive bowel sounds, no hepatosplenomegaly     Musculoskeletal:  No edema, warm, 2+ pulses throughout    Neurologic:  Moves all extremities.  AAOx3, CN II-XII reviewed     Psych:  Good insight, Not anxious nor agitated.       Data Review:    Review and/or order of clinical lab test      Labs:   No results for input(s): \"WBC\", \"HGB\", \"HCT\", \"PLT\" in the last 72 hours.  Recent Labs     08/11/24  0045      K 4.6      CO2 27   BUN 36*     Lab Results   Component Value Date/Time    ALT 17 08/01/2024 02:45 PM    GLOB 2.9 08/01/2024 02:45 PM     Lab Results   Component Value Date/Time    INR 1.1 08/05/2024 01:21 PM    APTT 25.2 08/05/2024 01:21 PM      No results found for: \"IRON\", \"TIBC\"   No results found for: \"RBCF\"   No results for input(s): \"PH\", \"PCO2\", \"PO2\" in the last 72 hours.  No results found for: \"CPK\"  No results found for: \"CHOL\", \"CHLST\", \"CHOLV\", \"HDL\", \"HDLC\", \"LDL\"  No results found for: \"GLUCPOC\"  No results found for:

## 2024-08-11 NOTE — CARE COORDINATION
Case Management Progress Note     Discharge Plan:  Anticipate DC to [facility] SNF--Pending AUTH (started on 8/9).  [facility] Medicare.     CM met with patient at bedside and informed her of discharge planning updates.      (1)  Last OT note 8/9 is recommending SNF.  PT note 8/9 is recommending HH vs SNF.  Pls have therapy re-eval if auth is still pending.    (2)  Insurance:  CM confirmed with Financial Services that patient has [place] as secondary insurance, which has been added to patient's face sheet.       (3)  Transportation at DC:  Either granddaughter vs Medicaid wheelchair.      (4)  UAI completed on 8/9.    (5)  CM to contact patient's granddaughter at DC to provide updates:  Love [phone].       Will continue to follow with SNF auth.  Patient stated no other needs at this time.    ______________________________________  JAZMINE Young, RN-CM  [facility]- Care Management  Available via [employer]  8/11/2024  1:41 PM

## 2024-08-11 NOTE — PLAN OF CARE
Problem: Chronic Conditions and Co-morbidities  Goal: Patient's chronic conditions and co-morbidity symptoms are monitored and maintained or improved  Outcome: HH/HSPC Progressing     Problem: Discharge Planning  Goal: Discharge to home or other facility with appropriate resources  Outcome: HH/HSPC Progressing     Problem: Safety - Adult  Goal: Free from fall injury  8/11/2024 1059 by Yesi Machado, RN  Outcome: HH/HSPC Progressing  8/10/2024 2319 by Larisa Noonan RN  Outcome: Progressing     Problem: Pain  Goal: Verbalizes/displays adequate comfort level or baseline comfort level  Outcome: HH/HSPC Progressing     Problem: Skin/Tissue Integrity  Goal: Absence of new skin breakdown  Description: 1.  Monitor for areas of redness and/or skin breakdown  2.  Assess vascular access sites hourly  3.  Every 4-6 hours minimum:  Change oxygen saturation probe site  4.  Every 4-6 hours:  If on nasal continuous positive airway pressure, respiratory therapy assess nares and determine need for appliance change or resting period.  Outcome: HH/HSPC Progressing

## 2024-08-12 VITALS
HEIGHT: 63 IN | OXYGEN SATURATION: 100 % | TEMPERATURE: 98.2 F | RESPIRATION RATE: 16 BRPM | HEART RATE: 71 BPM | BODY MASS INDEX: 39.16 KG/M2 | SYSTOLIC BLOOD PRESSURE: 119 MMHG | DIASTOLIC BLOOD PRESSURE: 69 MMHG | WEIGHT: 221 LBS

## 2024-08-12 LAB
GLUCOSE BLD STRIP.AUTO-MCNC: 132 MG/DL (ref 65–117)
GLUCOSE BLD STRIP.AUTO-MCNC: 145 MG/DL (ref 65–117)
SERVICE CMNT-IMP: ABNORMAL
SERVICE CMNT-IMP: ABNORMAL

## 2024-08-12 PROCEDURE — 97530 THERAPEUTIC ACTIVITIES: CPT

## 2024-08-12 PROCEDURE — 6370000000 HC RX 637 (ALT 250 FOR IP): Performed by: STUDENT IN AN ORGANIZED HEALTH CARE EDUCATION/TRAINING PROGRAM

## 2024-08-12 PROCEDURE — 6370000000 HC RX 637 (ALT 250 FOR IP): Performed by: HOSPITALIST

## 2024-08-12 PROCEDURE — 94761 N-INVAS EAR/PLS OXIMETRY MLT: CPT

## 2024-08-12 PROCEDURE — 82962 GLUCOSE BLOOD TEST: CPT

## 2024-08-12 PROCEDURE — 97116 GAIT TRAINING THERAPY: CPT

## 2024-08-12 RX ADMIN — PANTOPRAZOLE SODIUM 40 MG: 40 TABLET, DELAYED RELEASE ORAL at 05:23

## 2024-08-12 RX ADMIN — FUROSEMIDE 20 MG: 20 TABLET ORAL at 09:06

## 2024-08-12 RX ADMIN — OXYCODONE HYDROCHLORIDE AND ACETAMINOPHEN 500 MG: 500 TABLET ORAL at 09:06

## 2024-08-12 RX ADMIN — OXYCODONE AND ACETAMINOPHEN 1 TABLET: 7.5; 325 TABLET ORAL at 09:06

## 2024-08-12 RX ADMIN — SPIRONOLACTONE 25 MG: 25 TABLET ORAL at 09:06

## 2024-08-12 RX ADMIN — DULOXETINE HYDROCHLORIDE 60 MG: 30 CAPSULE, DELAYED RELEASE ORAL at 09:06

## 2024-08-12 RX ADMIN — Medication 400 MG: at 09:06

## 2024-08-12 RX ADMIN — HYDROXYCHLOROQUINE SULFATE 200 MG: 200 TABLET ORAL at 09:06

## 2024-08-12 RX ADMIN — SACUBITRIL AND VALSARTAN 1 TABLET: 24; 26 TABLET, FILM COATED ORAL at 09:06

## 2024-08-12 RX ADMIN — METOPROLOL SUCCINATE 50 MG: 50 TABLET, EXTENDED RELEASE ORAL at 09:06

## 2024-08-12 RX ADMIN — FOLIC ACID TAB 400 MCG 400 MCG: 400 TAB at 09:06

## 2024-08-12 RX ADMIN — TRIAMCINOLONE ACETONIDE: 1 CREAM TOPICAL at 09:08

## 2024-08-12 ASSESSMENT — PAIN SCALES - GENERAL
PAINLEVEL_OUTOF10: 3
PAINLEVEL_OUTOF10: 3

## 2024-08-12 ASSESSMENT — PAIN DESCRIPTION - LOCATION
LOCATION: BACK
LOCATION: GENERALIZED

## 2024-08-12 NOTE — CARE COORDINATION
3:24 PM    [facility] has accepted in [facility]; CM spoke with pt and she verbalized agreement/understanding. No other accepting HH agency due to pt's insurance plan.    [facility] will contact pt to arrange SOC.    DALTON Leger        3:05 PM    Pt will discharge to son/daughter in laws home at:    [address] [address]    CM gave this address to agencies via [facility] and will follow.  DALTON Leger        3:00 PM  08/12/24 08/12/24 1277   Services At/After Discharge   Transition of Care Consult (CM Consult) Home Health   Internal Home Health No   Reason Outside Agency Chosen Managed care specific requirement   Services At/After Discharge Home Health   Mode of Transport at Discharge Other (see comment)  (daughter in law will transport)   Confirm Follow Up Transport Family   Condition of Participation: Discharge Planning   The Patient and/or Patient Representative was provided with a Choice of Provider? Patient   The Patient and/Or Patient Representative agree with the Discharge Plan? Yes   Freedom of Choice list was provided with basic dialogue that supports the patient's individualized plan of care/goals, treatment preferences, and shares the quality data associated with the providers?  Yes         Care Management Progress Note    Reason for Admission:   JULY (acute kidney injury) ([facility]) [N17.9]  Fall, initial encounter [W19.XXXA]  Compression fracture of T7 vertebra, initial encounter ([facility]) [S22.060A]         Patient Admission Status: Inpatient  RUR: Readmission Risk Score: 12.6    Hospitalization in the last 30 days (Readmission):  No        Transition of care plan:  Discharge anticipated today- home with home health, pt discussed during IDR.    Authorization for SNF rehab at [facility] was not approved- a peer to peer was offered and Dr. Guillen completed this but denial was upheld. CM spoke with pt and pt's daughter in law Baetriz- their wish

## 2024-08-12 NOTE — PLAN OF CARE
Problem: Physical Therapy - Adult  Goal: By Discharge: Performs mobility at highest level of function for planned discharge setting.  See evaluation for individualized goals.  Description: FUNCTIONAL STATUS PRIOR TO ADMISSION: Patient was modified independent using a rollator for functional mobility. Two or three falls falls per year reported.     HOME SUPPORT PRIOR TO ADMISSION: The patient lived alone with no local support. Does have a son nearby that she can stay with for a little while.    Physical Therapy Goals  Initiated 8/3/2024  1.  Patient will move from supine to sit and sit to supine, scoot up and down, and roll side to side in bed with independence within 7 day(s).    2.  Patient will perform sit to stand with independence within 7 day(s).  3.  Patient will transfer from bed to chair and chair to bed with independence using the least restrictive device within 7 day(s).  4.  Patient will ambulate with modified independence for 100 feet with the least restrictive device within 7 day(s).       Outcome: Progressing   PHYSICAL THERAPY TREATMENT    Patient: Sabina Heath (79 y.o. female)  Date: 8/12/2024  Diagnosis: JULY (acute kidney injury) ([facility]) [N17.9]  Fall, initial encounter [W19.XXXA]  Compression fracture of T7 vertebra, initial encounter ([facility]) [S22.060A] JULY (acute kidney injury) ([facility])      Precautions:                        ASSESSMENT:  Patient continues to benefit from skilled PT services and is progressing towards goals. Pt requires increased time with SBA/ Supervision for functional mobility tasks with rollator support. Tolerated increased distance with one standing rest break with occasional verbal cues for PLB. Reports LB fatigue/ pain with increased activity. Pt hopeful to discharge to SNF today tho may by able to return home with family support and HHPT.          PLAN:  Patient continues to benefit from skilled intervention to address the above impairments.  Continue treatment per

## 2024-08-12 NOTE — PLAN OF CARE
Scooting towards EOB and SBA sit<->Stand and toilet transfer with rollator. Patient is SBA for oral/facial/hand hygiene seated at sink, SBA UB bathing/dressing and SBA LB bathing to knees (max A distally) and SBA donning/doffing undergarments/pants with rest breaks. Patient is SBA toileting/cloth mgmt. Patient would benefit from continued skilled OT services while at [facility] In order to increase safety and independence with self care and functional transfers/mobility. Per chart review, plan is to discharge to SNF.              PLAN  Goals have been updated based on progression since last assessment.  Patient continues to benefit from skilled intervention to address the above impairments.    Recommendations and Planned Interventions:   self care training, therapeutic activities, functional mobility training, balance training, therapeutic exercise, endurance activities, patient education, home safety training, and family training/education    Frequency/Duration: OT Plan of Care: 5 times/week    Recommend with staff: up to chair for meals, up to commode for toileting    Recommend next OT session: continue to progress towards goals    Recommendation for discharge: (in order for the patient to meet his/her long term goals):   SNF is planned    Other factors to consider for discharge: lives alone    IF patient discharges home will need the following DME: sock aid, long handled sponge, long handled shoe horn     SUBJECTIVE:   Patient stated “I am hopefully going to rehab today.”    OBJECTIVE DATA SUMMARY:   Cognitive/Behavioral Status:     Functional Mobility and Transfers for ADLs:  Bed Mobility:  Bed Mobility Training  Bed Mobility Training: Yes  Scooting: Stand-by assistance (scooting EOB)     Transfers:   Transfer Training  Transfer Training: Yes  Interventions: Verbal cues  Sit to Stand: Stand-by assistance  Stand to Sit: Stand-by assistance  Bed to Chair: Stand-by assistance  Toilet Transfer: Stand-by

## 2024-08-12 NOTE — DISCHARGE SUMMARY
[facility]  [address] [place], [place] [id number] [phone]    [facility] Adult  Hospitalist Group     Discharge Summary       PATIENT ID: Sabina Heath  MRN: [id number]   YOB: 1945    DATE OF ADMISSION: 8/1/2024  2:25 PM    DATE OF DISCHARGE: 08/12/24   PRIMARY CARE PROVIDER: No primary care provider on file.     DISCHARGING PROVIDER: Andria Guillen MD      CONSULTATIONS: IP CONSULT TO INTERVENTIONAL RADIOLOGY  IP CONSULT TO PHARMACY    PROCEDURES/SURGERIES: * No surgery found *    ADMITTING DIAGNOSES & HOSPITAL COURSE:   79-year-old female with a history of A-fib, diabetes, rheumatoid arthritis, hyperlipidemia, renal failure comes to the hospital after having a fall at home. Patient was found to have a T7 compression fracture.    T7 compression fracture  GLF  Pain control, PT OT  S/p khypho on 8/7   MRI: Multilevel degenerative plate osteophytes, facet arthropathy, epidurallipomatosis, and small disc herniations. Multilevel mild spinal canal stenosis, most severe at T3-T4. Multilevel bilateral foraminal stenosis, T7 acute compression fracture.  Pending SNF     Multilevel degenerative plate osteophytes  As reported on MRI  Follow-up outpatient discussed with the patient     Atrial fibrillation  Patient has a pacemaker and defibrillator.   Last EF 40 to 45% with mild MVR, repeated echo was EF 50-55%  Continue metoprolol   Continue Xarelto      CHF  Stable and Not in exacerbation  Repeated TTE with EF 50-55%  Continue Entresto, spironolactone, metoprolol, Lasix     Yuly-Yuly disease  Continue topical triamcinolone     Diabetes 2  Patient is taking metformin at home.   Continue sliding scale insulin.     Rheumatoid arthritis  Patient is on Plaquenil and methotrexate/folic acid.     Hyperlipidemia  Continue Lipitor 40 mg nightly     Thrombocytopenia  Stable   Monitor platelets closely     Renal failure, been stable, CKD stage IV.  Unknown  Read the directions carefully, and ask your doctor or other care provider to review them with you.                CONTINUE taking these medications      albuterol sulfate  (90 Base) MCG/ACT inhaler  Commonly known as: PROVENTIL;VENTOLIN;PROAIR     ascorbic acid 500 MG tablet  Commonly known as: VITAMIN C     atorvastatin 40 MG tablet  Commonly known as: LIPITOR     betamethasone dipropionate 0.05 % cream     DULoxetine 60 MG extended release capsule  Commonly known as: CYMBALTA     folic acid 400 MCG tablet  Commonly known as: FOLVITE     furosemide 20 MG tablet  Commonly known as: LASIX     hydroxychloroquine 200 MG tablet  Commonly known as: PLAQUENIL     lisinopril 10 MG tablet  Commonly known as: PRINIVIL;ZESTRIL     magnesium oxide 400 MG tablet  Commonly known as: MAG-OX     metFORMIN 500 MG tablet  Commonly known as: GLUCOPHAGE     methotrexate 2.5 MG chemo tablet  Commonly known as: RHEUMATREX     metoprolol succinate 50 MG extended release tablet  Commonly known as: TOPROL XL  Take 1 tablet by mouth daily            STOP taking these medications      oxyCODONE-acetaminophen 7.5-325 MG per tablet  Commonly known as: PERCOCET     Sennosides-Docusate Sodium 8.6-50 MG Caps  Replaced by: sennosides-docusate sodium 8.6-50 MG tablet     sotalol 80 MG tablet  Commonly known as: BETAPACE            ASK your doctor about these medications      oxyCODONE-acetaminophen 7.5-325 MG per tablet  Commonly known as: PERCOCET  Take 1 tablet by mouth every 6 hours as needed for Pain for up to 3 days. Max Daily Amount: 4 tablets  Ask about: Should I take this medication?               Where to Get Your Medications        These medications were sent to [id number] - [place], [place] - [address] [facility] [address] - P [phone] - F [phone]  [address] [address] [address]      Phone: [phone]   lidocaine 4 % external patch  metoprolol succinate 50 MG extended release

## 2024-08-12 NOTE — PROGRESS NOTES
[facility]  [address] [place] [address] [phone]    [facility] Adult  Hospitalist Group                                                                                          Hospitalist Progress Note  Andria Guillen MD        Date of Service:  2024  NAME:  Sabina Heath  :  1945  MRN:  [id number]      Admission Summary:   79-year-old female with a history of A-fib, diabetes, rheumatoid arthritis, hyperlipidemia, renal failure comes to the hospital after having a fall at home. Patient was found to have a T7 compression fracture. Patient is admitted for pain control, PT OT evaluation and IR consult, currently pending SNF     Interval history / Subjective:   No new complaints     Assessment & Plan:     T7 compression fracture  GLF  Pain control, PT OT  S/p katia on    MRI: Multilevel degenerative plate osteophytes, facet arthropathy, epidurallipomatosis, and small disc herniations. Multilevel mild spinal canal stenosis, most severe at T3-T4. Multilevel bilateral foraminal stenosis, T7 acute compression fracture.  Pending SNF     Multilevel degenerative plate osteophytes  As reported on MRI  Follow-up outpatient discussed with the patient     Atrial fibrillation  Patient has a pacemaker and defibrillator.   Last EF 40 to 45% with mild MVR, repeated echo was EF 50-55%  Continue metoprolol   Continue Xarelto      CHF  Stable and Not in exacerbation  Repeated TTE with EF 50-55%  Continue Entresto, spironolactone, metoprolol, Lasix     Yuly-Yuly disease  Continue topical triamcinolone     Diabetes 2  Patient is taking metformin at home.   Continue sliding scale insulin.     Rheumatoid arthritis  Patient is on Plaquenil and methotrexate/folic acid.     Hyperlipidemia  Continue Lipitor 40 mg nightly     Thrombocytopenia  Stable   Monitor platelets closely     Renal failure, been stable, CKD stage IV.  Unknown baseline  Daily labs  FU    Medication Dose Route Frequency    lidocaine 4 % external patch 1 patch  1 patch TransDERmal Daily    polyethylene glycol (GLYCOLAX) packet 17 g  17 g Oral Daily    triamcinolone (KENALOG) 0.1 % cream   Topical BID    hydroxychloroquine (PLAQUENIL) tablet 200 mg  200 mg Oral BID    sacubitril-valsartan (ENTRESTO) 24-26 MG per tablet 1 tablet  1 tablet Oral BID    spironolactone (ALDACTONE) tablet 25 mg  25 mg Oral Daily    sodium chloride flush 0.9 % injection 5-40 mL  5-40 mL IntraVENous 2 times per day    sodium chloride flush 0.9 % injection 5-40 mL  5-40 mL IntraVENous PRN    0.9 % sodium chloride infusion   IntraVENous PRN    ondansetron (ZOFRAN-ODT) disintegrating tablet 4 mg  4 mg Oral Q8H PRN    Or    ondansetron (ZOFRAN) injection 4 mg  4 mg IntraVENous Q6H PRN    polyethylene glycol (GLYCOLAX) packet 17 g  17 g Oral Daily PRN    acetaminophen (TYLENOL) tablet 650 mg  650 mg Oral Q6H PRN    Or    acetaminophen (TYLENOL) suppository 650 mg  650 mg Rectal Q6H PRN    ascorbic acid (VITAMIN C) tablet 500 mg  500 mg Oral Daily    albuterol (PROVENTIL) (2.5 MG/3ML) 0.083% nebulizer solution 2.5 mg  2.5 mg Nebulization Q4H PRN    atorvastatin (LIPITOR) tablet 40 mg  40 mg Oral QHS    DULoxetine (CYMBALTA) extended release capsule 60 mg  60 mg Oral Daily    folic acid (FOLVITE) tablet 400 mcg  400 mcg Oral Daily    furosemide (LASIX) tablet 20 mg  20 mg Oral Daily    oxyCODONE-acetaminophen (PERCOCET) 7.5-325 MG per tablet 1 tablet  1 tablet Oral Q6H PRN    pantoprazole (PROTONIX) tablet 40 mg  40 mg Oral QAM AC    sennosides-docusate sodium (SENOKOT-S) 8.6-50 MG tablet 1 tablet  1 tablet Oral Daily PRN    dextrose 10 % infusion   IntraVENous Continuous PRN    rivaroxaban (XARELTO) tablet 15 mg  15 mg Oral Dinner    metoprolol succinate (TOPROL XL) extended release tablet 50 mg  50 mg Oral Daily    magnesium oxide (MAG-OX) tablet 400 mg  400 mg Oral Daily    morphine sulfate (PF) injection 2 mg  2 mg IntraVENous

## 2025-05-15 ENCOUNTER — HOSPITAL ENCOUNTER (EMERGENCY)
Facility: HOSPITAL | Age: 80
Discharge: HOME | End: 2025-05-16
Attending: STUDENT IN AN ORGANIZED HEALTH CARE EDUCATION/TRAINING PROGRAM
Payer: MEDICARE

## 2025-05-15 ENCOUNTER — APPOINTMENT (OUTPATIENT)
Facility: HOSPITAL | Age: 80
End: 2025-05-15
Payer: MEDICARE

## 2025-05-15 DIAGNOSIS — R10.9 FLANK PAIN: Primary | ICD-10-CM

## 2025-05-15 LAB
ALBUMIN SERPL-MCNC: 3.8 G/DL (ref 3.5–5)
ALBUMIN/GLOB SERPL: 1.1 (ref 1.1–2.2)
ALP SERPL-CCNC: 74 U/L (ref 45–117)
ALT SERPL-CCNC: 26 U/L (ref 12–78)
ANION GAP SERPL CALC-SCNC: 5 MMOL/L (ref 2–12)
APPEARANCE UR: CLEAR
AST SERPL-CCNC: 29 U/L (ref 15–37)
BACTERIA URNS QL MICRO: NEGATIVE /HPF
BASOPHILS # BLD: 0.01 K/UL (ref 0–0.1)
BASOPHILS NFR BLD: 0.1 % (ref 0–1)
BILIRUB SERPL-MCNC: 0.6 MG/DL (ref 0.2–1)
BILIRUB UR QL: NEGATIVE
BUN SERPL-MCNC: 33 MG/DL (ref 6–20)
BUN/CREAT SERPL: 21 (ref 12–20)
CALCIUM SERPL-MCNC: 9.3 MG/DL (ref 8.5–10.1)
CHLORIDE SERPL-SCNC: 106 MMOL/L (ref 97–108)
CO2 SERPL-SCNC: 27 MMOL/L (ref 21–32)
COLOR UR: NORMAL
COMMENT:: NORMAL
CREAT SERPL-MCNC: 1.57 MG/DL (ref 0.55–1.02)
DIFFERENTIAL METHOD BLD: ABNORMAL
EOSINOPHIL # BLD: 0 K/UL (ref 0–0.4)
EOSINOPHIL NFR BLD: 0 % (ref 0–7)
EPITH CASTS URNS QL MICRO: NORMAL /LPF
ERYTHROCYTE [DISTWIDTH] IN BLOOD BY AUTOMATED COUNT: 13.2 % (ref 11.5–14.5)
GLOBULIN SER CALC-MCNC: 3.6 G/DL (ref 2–4)
GLUCOSE SERPL-MCNC: 210 MG/DL (ref 65–100)
GLUCOSE UR STRIP.AUTO-MCNC: NEGATIVE MG/DL
HCT VFR BLD AUTO: 37.3 % (ref 35–47)
HGB BLD-MCNC: 12.1 G/DL (ref 11.5–16)
HGB UR QL STRIP: NEGATIVE
HYALINE CASTS URNS QL MICRO: NORMAL /LPF (ref 0–2)
IMM GRANULOCYTES # BLD AUTO: 0.03 K/UL (ref 0–0.04)
IMM GRANULOCYTES NFR BLD AUTO: 0.4 % (ref 0–0.5)
KETONES UR QL STRIP.AUTO: NEGATIVE MG/DL
LEUKOCYTE ESTERASE UR QL STRIP.AUTO: NEGATIVE
LYMPHOCYTES # BLD: 0.56 K/UL (ref 0.8–3.5)
LYMPHOCYTES NFR BLD: 7.2 % (ref 12–49)
MCH RBC QN AUTO: 30 PG (ref 26–34)
MCHC RBC AUTO-ENTMCNC: 32.4 G/DL (ref 30–36.5)
MCV RBC AUTO: 92.3 FL (ref 80–99)
MONOCYTES # BLD: 0.25 K/UL (ref 0–1)
MONOCYTES NFR BLD: 3.2 % (ref 5–13)
NEUTS SEG # BLD: 6.95 K/UL (ref 1.8–8)
NEUTS SEG NFR BLD: 89.1 % (ref 32–75)
NITRITE UR QL STRIP.AUTO: NEGATIVE
NRBC # BLD: 0 K/UL (ref 0–0.01)
NRBC BLD-RTO: 0 PER 100 WBC
PH UR STRIP: 6.5 (ref 5–8)
PLATELET # BLD AUTO: 154 K/UL (ref 150–400)
PMV BLD AUTO: 10.6 FL (ref 8.9–12.9)
POTASSIUM SERPL-SCNC: 4.9 MMOL/L (ref 3.5–5.1)
PROT SERPL-MCNC: 7.4 G/DL (ref 6.4–8.2)
PROT UR STRIP-MCNC: NEGATIVE MG/DL
RBC # BLD AUTO: 4.04 M/UL (ref 3.8–5.2)
RBC #/AREA URNS HPF: NORMAL /HPF (ref 0–5)
RBC MORPH BLD: ABNORMAL
SODIUM SERPL-SCNC: 138 MMOL/L (ref 136–145)
SP GR UR REFRACTOMETRY: 1.02 (ref 1–1.03)
SPECIMEN HOLD: NORMAL
UROBILINOGEN UR QL STRIP.AUTO: 1 EU/DL (ref 0.2–1)
WBC # BLD AUTO: 7.8 K/UL (ref 3.6–11)
WBC URNS QL MICRO: NORMAL /HPF (ref 0–4)

## 2025-05-15 PROCEDURE — 81001 URINALYSIS AUTO W/SCOPE: CPT

## 2025-05-15 PROCEDURE — 2580000003 HC RX 258: Performed by: STUDENT IN AN ORGANIZED HEALTH CARE EDUCATION/TRAINING PROGRAM

## 2025-05-15 PROCEDURE — 99285 EMERGENCY DEPT VISIT HI MDM: CPT

## 2025-05-15 PROCEDURE — 80053 COMPREHEN METABOLIC PANEL: CPT

## 2025-05-15 PROCEDURE — 85025 COMPLETE CBC W/AUTO DIFF WBC: CPT

## 2025-05-15 PROCEDURE — 36415 COLL VENOUS BLD VENIPUNCTURE: CPT

## 2025-05-15 RX ORDER — IOPAMIDOL 755 MG/ML
100 INJECTION, SOLUTION INTRAVASCULAR
Status: COMPLETED | OUTPATIENT
Start: 2025-05-15 | End: 2025-05-16

## 2025-05-15 RX ORDER — 0.9 % SODIUM CHLORIDE 0.9 %
1000 INTRAVENOUS SOLUTION INTRAVENOUS ONCE
Status: COMPLETED | OUTPATIENT
Start: 2025-05-15 | End: 2025-05-16

## 2025-05-15 RX ADMIN — SODIUM CHLORIDE 1000 ML: 0.9 INJECTION, SOLUTION INTRAVENOUS at 23:05

## 2025-05-15 ASSESSMENT — PAIN DESCRIPTION - ORIENTATION: ORIENTATION: RIGHT

## 2025-05-15 ASSESSMENT — PAIN DESCRIPTION - LOCATION: LOCATION: FLANK

## 2025-05-15 ASSESSMENT — PAIN - FUNCTIONAL ASSESSMENT: PAIN_FUNCTIONAL_ASSESSMENT: 0-10

## 2025-05-15 ASSESSMENT — PAIN SCALES - GENERAL: PAINLEVEL_OUTOF10: 10

## 2025-05-15 NOTE — ED TRIAGE NOTES
Patient arrived via EMS with complaints of right flank pain.  Pain 10/10 when she has random jolts of pain.  Getting worse the last five days, started out as stiffness.  Same pain as five years ago.  Per EMS and patient, had a \"mass\" in her right flank five years ago that put her in a SNF for a year.  \"Fighting nasal congestion for the past three weeks\" that she says has moved to her right lung.      Took 7.5 of oxycodone and a pain patch prior to arrival.  Oxy is for rheumatoid arthritis.    Says urine is cloudy and recently she has been incontinent.      Hx of C7 surgery last August.  Hx Yuly-Yuly disease.  Afib on blood thinners.  Hx of blood clots in legs.

## 2025-05-16 ENCOUNTER — APPOINTMENT (OUTPATIENT)
Facility: HOSPITAL | Age: 80
End: 2025-05-16
Payer: MEDICARE

## 2025-05-16 VITALS
SYSTOLIC BLOOD PRESSURE: 169 MMHG | RESPIRATION RATE: 15 BRPM | WEIGHT: 215 LBS | OXYGEN SATURATION: 96 % | TEMPERATURE: 98.4 F | DIASTOLIC BLOOD PRESSURE: 70 MMHG | BODY MASS INDEX: 38.09 KG/M2 | HEART RATE: 85 BPM

## 2025-05-16 PROCEDURE — 6360000004 HC RX CONTRAST MEDICATION: Performed by: STUDENT IN AN ORGANIZED HEALTH CARE EDUCATION/TRAINING PROGRAM

## 2025-05-16 PROCEDURE — 74177 CT ABD & PELVIS W/CONTRAST: CPT

## 2025-05-16 RX ADMIN — IOPAMIDOL 100 ML: 755 INJECTION, SOLUTION INTRAVENOUS at 00:25

## 2025-05-16 NOTE — ED PROVIDER NOTES
HISTORY OF PRESENT ILLNESS:  An 80-year-old female with a past medical history significant for rheumatoid arthritis, gastroesophageal reflux disease, diabetes, atrial fibrillation, congestive heart failure, and deep vein thrombosis, self-transported to the Emergency Department. The history was provided by the patient herself. She presents with right flank pain, described as random jolts pointing to the right hip area. The pain severity at home was 10/10, though she is currently pain-free at rest. The pain has worsened over the last 5 days, initially starting as stiffness. She denies any recent falls or injuries. The patient reports a similar episode approximately 5 years ago, which was initially diagnosed as a mass in the right flank but later clarified as an infection. She resided in a nursing home for about a year following that episode. She denies fevers, chills, chest pain, shortness of breath, nausea, vomiting, abdominal pain, diarrhea, or constipation. She has experienced recent sinus congestion and suspects the infection may have spread from her sinus to her flank. Prior to arrival, she used a lidocaine patch and 7.5 mg oxycodone for pain management.    PAST MEDICAL HISTORY:  - Rheumatoid arthritis  - Gastroesophageal reflux disease (GERD)  - Diabetes  - Atrial fibrillation  - Congestive heart failure (CHF)  - Deep vein thrombosis (DVTs)    PHYSICAL EXAM:  Vitals: Interpreted as normal for this patient.  General: NAD. Well-kept female adult. Morbidly obese.  Eyes: Appear normal with no scleral icterus.  HENT: Atraumatic. Moist mucous membranes, no pharyngeal erythema, edema or lesions.  Neck: Atraumatic, supple.  Cardiac: Regular rate, regular rhythm, no significant murmurs appreciated.  Respiratory: No respiratory distress, clear lungs bilaterally with no abnormal breath sounds.  Abdomen: Soft. Mild tenderness in the right lower quadrant. Nontender elsewhere. Nondistended. No rebound. No guarding. No

## 2025-09-04 ENCOUNTER — TRANSCRIBE ORDERS (OUTPATIENT)
Facility: HOSPITAL | Age: 80
End: 2025-09-04

## 2025-09-04 DIAGNOSIS — N63.0 MASS OF BREAST, UNSPECIFIED LATERALITY: Primary | ICD-10-CM

## 2025-09-05 ENCOUNTER — TRANSCRIBE ORDERS (OUTPATIENT)
Facility: HOSPITAL | Age: 80
End: 2025-09-05

## 2025-09-05 DIAGNOSIS — Z12.31 VISIT FOR SCREENING MAMMOGRAM: Primary | ICD-10-CM
